# Patient Record
Sex: FEMALE | Race: WHITE | Employment: UNEMPLOYED | URBAN - NONMETROPOLITAN AREA
[De-identification: names, ages, dates, MRNs, and addresses within clinical notes are randomized per-mention and may not be internally consistent; named-entity substitution may affect disease eponyms.]

---

## 2019-07-23 ENCOUNTER — APPOINTMENT (OUTPATIENT)
Dept: GENERAL RADIOLOGY | Age: 29
End: 2019-07-23
Payer: MEDICAID

## 2019-07-23 ENCOUNTER — HOSPITAL ENCOUNTER (EMERGENCY)
Age: 29
Discharge: HOME | End: 2019-07-23
Payer: MEDICAID

## 2019-07-23 VITALS
BODY MASS INDEX: 22.5 KG/M2 | HEIGHT: 66 IN | HEART RATE: 100 BPM | TEMPERATURE: 98.3 F | RESPIRATION RATE: 14 BRPM | SYSTOLIC BLOOD PRESSURE: 118 MMHG | DIASTOLIC BLOOD PRESSURE: 59 MMHG | WEIGHT: 140 LBS | OXYGEN SATURATION: 98 %

## 2019-07-23 DIAGNOSIS — L98.492: Primary | ICD-10-CM

## 2019-07-23 LAB
ACETAMINOPHEN LEVEL: < 5 UG/ML (ref 0–20)
ALBUMIN SERPL-MCNC: 4.3 G/DL (ref 3.5–5.1)
ALP BLD-CCNC: 51 U/L (ref 38–126)
ALT SERPL-CCNC: 8 U/L (ref 11–66)
ANION GAP SERPL CALCULATED.3IONS-SCNC: 14 MEQ/L (ref 8–16)
AST SERPL-CCNC: 11 U/L (ref 5–40)
BASOPHILS # BLD: 0.5 %
BASOPHILS ABSOLUTE: 0 THOU/MM3 (ref 0–0.1)
BILIRUB SERPL-MCNC: 0.3 MG/DL (ref 0.3–1.2)
BILIRUBIN DIRECT: < 0.2 MG/DL (ref 0–0.3)
BUN BLDV-MCNC: 6 MG/DL (ref 7–22)
CALCIUM SERPL-MCNC: 9.4 MG/DL (ref 8.5–10.5)
CHLORIDE BLD-SCNC: 109 MEQ/L (ref 98–111)
CO2: 21 MEQ/L (ref 23–33)
CREAT SERPL-MCNC: 0.7 MG/DL (ref 0.4–1.2)
EOSINOPHIL # BLD: 1.3 %
EOSINOPHILS ABSOLUTE: 0.1 THOU/MM3 (ref 0–0.4)
ERYTHROCYTE [DISTWIDTH] IN BLOOD BY AUTOMATED COUNT: 14.6 % (ref 11.5–14.5)
ERYTHROCYTE [DISTWIDTH] IN BLOOD BY AUTOMATED COUNT: 48.2 FL (ref 35–45)
ETHYL ALCOHOL, SERUM: < 0.01 %
GFR SERPL CREATININE-BSD FRML MDRD: > 90 ML/MIN/1.73M2
GLUCOSE BLD-MCNC: 95 MG/DL (ref 70–108)
HCT VFR BLD CALC: 36.5 % (ref 37–47)
HEMOGLOBIN: 12 GM/DL (ref 12–16)
IMMATURE GRANS (ABS): 0.02 THOU/MM3 (ref 0–0.07)
IMMATURE GRANULOCYTES: 0.2 %
LACTIC ACID: 1 MMOL/L (ref 0.5–2.2)
LYMPHOCYTES # BLD: 23 %
LYMPHOCYTES ABSOLUTE: 1.9 THOU/MM3 (ref 1–4.8)
MCH RBC QN AUTO: 29.6 PG (ref 26–33)
MCHC RBC AUTO-ENTMCNC: 32.9 GM/DL (ref 32.2–35.5)
MCV RBC AUTO: 90.1 FL (ref 81–99)
MONOCYTES # BLD: 7 %
MONOCYTES ABSOLUTE: 0.6 THOU/MM3 (ref 0.4–1.3)
NUCLEATED RED BLOOD CELLS: 0 /100 WBC
OSMOLALITY CALCULATION: 284.3 MOSMOL/KG (ref 275–300)
PLATELET # BLD: 324 THOU/MM3 (ref 130–400)
PMV BLD AUTO: 9.5 FL (ref 9.4–12.4)
POTASSIUM SERPL-SCNC: 3.6 MEQ/L (ref 3.5–5.2)
RBC # BLD: 4.05 MILL/MM3 (ref 4.2–5.4)
SALICYLATE, SERUM: < 0.3 MG/DL (ref 2–10)
SEG NEUTROPHILS: 68 %
SEGMENTED NEUTROPHILS ABSOLUTE COUNT: 5.6 THOU/MM3 (ref 1.8–7.7)
SODIUM BLD-SCNC: 144 MEQ/L (ref 135–145)
TOTAL PROTEIN: 7.4 G/DL (ref 6.1–8)
WBC # BLD: 8.2 THOU/MM3 (ref 4.8–10.8)

## 2019-07-23 PROCEDURE — 73090 X-RAY EXAM OF FOREARM: CPT

## 2019-07-23 PROCEDURE — 36415 COLL VENOUS BLD VENIPUNCTURE: CPT

## 2019-07-23 PROCEDURE — 2709999900 HC NON-CHARGEABLE SUPPLY

## 2019-07-23 PROCEDURE — 80053 COMPREHEN METABOLIC PANEL: CPT

## 2019-07-23 PROCEDURE — G0480 DRUG TEST DEF 1-7 CLASSES: HCPCS

## 2019-07-23 PROCEDURE — 82248 BILIRUBIN DIRECT: CPT

## 2019-07-23 PROCEDURE — 87040 BLOOD CULTURE FOR BACTERIA: CPT

## 2019-07-23 PROCEDURE — 6360000002 HC RX W HCPCS: Performed by: PHYSICIAN ASSISTANT

## 2019-07-23 PROCEDURE — 90471 IMMUNIZATION ADMIN: CPT | Performed by: PHYSICIAN ASSISTANT

## 2019-07-23 PROCEDURE — 90715 TDAP VACCINE 7 YRS/> IM: CPT | Performed by: PHYSICIAN ASSISTANT

## 2019-07-23 PROCEDURE — 83605 ASSAY OF LACTIC ACID: CPT

## 2019-07-23 PROCEDURE — 6370000000 HC RX 637 (ALT 250 FOR IP): Performed by: PHYSICIAN ASSISTANT

## 2019-07-23 PROCEDURE — 85025 COMPLETE CBC W/AUTO DIFF WBC: CPT

## 2019-07-23 PROCEDURE — 99283 EMERGENCY DEPT VISIT LOW MDM: CPT

## 2019-07-23 RX ORDER — GINSENG 100 MG
CAPSULE ORAL ONCE
Status: COMPLETED | OUTPATIENT
Start: 2019-07-23 | End: 2019-07-23

## 2019-07-23 RX ORDER — NAPROXEN 500 MG/1
500 TABLET ORAL 2 TIMES DAILY
Qty: 60 TABLET | Refills: 0 | Status: SHIPPED | OUTPATIENT
Start: 2019-07-23

## 2019-07-23 RX ORDER — BACITRACIN, NEOMYCIN, POLYMYXIN B 400; 3.5; 5 [USP'U]/G; MG/G; [USP'U]/G
OINTMENT TOPICAL
Qty: 35 G | Refills: 1 | Status: SHIPPED | OUTPATIENT
Start: 2019-07-23 | End: 2019-08-02

## 2019-07-23 RX ADMIN — TETANUS TOXOID, REDUCED DIPHTHERIA TOXOID AND ACELLULAR PERTUSSIS VACCINE, ADSORBED 0.5 ML: 5; 2.5; 8; 8; 2.5 SUSPENSION INTRAMUSCULAR at 21:34

## 2019-07-23 RX ADMIN — BACITRACIN: 500 OINTMENT TOPICAL at 21:34

## 2019-07-23 ASSESSMENT — PAIN DESCRIPTION - ORIENTATION
ORIENTATION: RIGHT;LEFT
ORIENTATION: RIGHT;LEFT;LOWER

## 2019-07-23 ASSESSMENT — PAIN DESCRIPTION - PAIN TYPE
TYPE: ACUTE PAIN
TYPE: ACUTE PAIN

## 2019-07-23 ASSESSMENT — PAIN DESCRIPTION - FREQUENCY
FREQUENCY: CONTINUOUS
FREQUENCY: CONTINUOUS

## 2019-07-23 ASSESSMENT — PAIN DESCRIPTION - LOCATION
LOCATION: ARM
LOCATION: ARM

## 2019-07-23 ASSESSMENT — PAIN SCALES - GENERAL
PAINLEVEL_OUTOF10: 9
PAINLEVEL_OUTOF10: 9

## 2019-07-23 ASSESSMENT — PAIN DESCRIPTION - DESCRIPTORS
DESCRIPTORS: ACHING
DESCRIPTORS: ACHING

## 2019-07-23 ASSESSMENT — PAIN DESCRIPTION - ONSET: ONSET: PROGRESSIVE

## 2019-07-29 LAB
BLOOD CULTURE, ROUTINE: NORMAL
BLOOD CULTURE, ROUTINE: NORMAL

## 2022-12-14 ENCOUNTER — HOSPITAL ENCOUNTER (OUTPATIENT)
Age: 32
Discharge: HOME | End: 2022-12-14
Payer: MEDICAID

## 2022-12-14 ENCOUNTER — OFFICE VISIT (OUTPATIENT)
Dept: FAMILY MEDICINE CLINIC | Age: 32
End: 2022-12-14
Payer: MEDICAID

## 2022-12-14 VITALS
RESPIRATION RATE: 16 BRPM | BODY MASS INDEX: 31.02 KG/M2 | SYSTOLIC BLOOD PRESSURE: 112 MMHG | WEIGHT: 193 LBS | DIASTOLIC BLOOD PRESSURE: 78 MMHG | OXYGEN SATURATION: 99 % | HEART RATE: 86 BPM | HEIGHT: 66 IN

## 2022-12-14 DIAGNOSIS — L30.9 DERMATITIS: Primary | ICD-10-CM

## 2022-12-14 DIAGNOSIS — Z32.01 POSITIVE URINE PREGNANCY TEST: ICD-10-CM

## 2022-12-14 LAB — HCG QUANTITATIVE: ABNORMAL MIU/ML

## 2022-12-14 PROCEDURE — 36415 COLL VENOUS BLD VENIPUNCTURE: CPT

## 2022-12-14 PROCEDURE — G8419 CALC BMI OUT NRM PARAM NOF/U: HCPCS | Performed by: NURSE PRACTITIONER

## 2022-12-14 PROCEDURE — G8427 DOCREV CUR MEDS BY ELIG CLIN: HCPCS | Performed by: NURSE PRACTITIONER

## 2022-12-14 PROCEDURE — 99203 OFFICE O/P NEW LOW 30 MIN: CPT | Performed by: NURSE PRACTITIONER

## 2022-12-14 PROCEDURE — G8484 FLU IMMUNIZE NO ADMIN: HCPCS | Performed by: NURSE PRACTITIONER

## 2022-12-14 PROCEDURE — 84702 CHORIONIC GONADOTROPIN TEST: CPT

## 2022-12-14 PROCEDURE — 4004F PT TOBACCO SCREEN RCVD TLK: CPT | Performed by: NURSE PRACTITIONER

## 2022-12-14 RX ORDER — DESONIDE 0.5 MG/G
CREAM TOPICAL
Qty: 60 G | Refills: 0 | Status: SHIPPED | OUTPATIENT
Start: 2022-12-14

## 2022-12-14 RX ORDER — OMEPRAZOLE 20 MG/1
20 CAPSULE, DELAYED RELEASE ORAL DAILY
COMMUNITY

## 2022-12-14 ASSESSMENT — PATIENT HEALTH QUESTIONNAIRE - PHQ9
SUM OF ALL RESPONSES TO PHQ9 QUESTIONS 1 & 2: 0
2. FEELING DOWN, DEPRESSED OR HOPELESS: 0
SUM OF ALL RESPONSES TO PHQ QUESTIONS 1-9: 0
1. LITTLE INTEREST OR PLEASURE IN DOING THINGS: 0
SUM OF ALL RESPONSES TO PHQ QUESTIONS 1-9: 0

## 2022-12-14 NOTE — PROGRESS NOTES
Subjective:      Patient ID: Archie Soto is a 27 y.o. female coming in for   Chief Complaint   Patient presents with    New Patient     New to provider    Pregnancy Test     Had a positive at home pregnancy test. Would like a pregnancy test to confirm. She has not seen an OBGYN but does have one in [address] LMP 10/17/22. Positive at home test a couple weeks ago. She does have some breast tenderness, no N/V. She is not taking a prenatal vitamin    Rash     Rash on her back, on her sides and under her breasts. Itching. Redness. X 1 month. HPI  Presents to office for new pt appt. Pt wanting pregnancy testing done. She has had a home positive test. LMP 10/17/22.  2 Para 1. She has not established with OB yet. No N/V symptoms, urinary issues/symptoms. She is not taking any prenatal vitamins. Rash to back, sides and under breast area comes and goes. Seems to be triggered by scented detergents. Has not tried anything other than otc hydrocortisone cream.     Review of Systems   Skin:  Positive for rash. All other systems reviewed and are negative. Objective:/78   Pulse 86   Resp 16   Ht 5' 6\" (1.676 m)   Wt 193 lb (87.5 kg)   LMP 10/18/2022 (Approximate)   SpO2 99%   BMI 31.15 kg/m²      Physical Exam  Vitals and nursing note reviewed. Constitutional:       General: She is not in acute distress. Appearance: Normal appearance. She is not ill-appearing. HENT:      Head: Normocephalic. Cardiovascular:      Rate and Rhythm: Normal rate and regular rhythm. Heart sounds: Normal heart sounds. Pulmonary:      Effort: Pulmonary effort is normal.      Breath sounds: Normal breath sounds. Musculoskeletal:      Right lower leg: No edema. Left lower leg: No edema. Skin:     General: Skin is warm and dry. Findings: No rash. Neurological:      General: No focal deficit present. Mental Status: She is alert and oriented to person, place, and time. Assessment:      1. Dermatitis    2. Positive urine pregnancy test           Plan:    - desonide for rash, change laundry soaps  - will obtain hcg blood test, OB referral placed. Recommended daily prenatal mvi. Advised to not take any nsaids. May take tylenol, benadryl and omeprazole. -f/u with me as needed. Orders Placed This Encounter   Procedures    hCG, Quantitative, Pregnancy     Standing Status:   Future     Number of Occurrences:   1     Standing Expiration Date:   12/14/2023    [facility] , NP, OB/GYN, [place]     Referral Priority:   Routine     Referral Type:   Eval and Treat     Referral Reason:   Specialty Services Required     Referred to Provider:   PRINCESS Cardoza CNP     Requested Specialty:   Obstetrics & Gynecology     Number of Visits Requested:   1      Outpatient Encounter Medications as of 12/14/2022   Medication Sig Dispense Refill    omeprazole (PRILOSEC) 20 MG delayed release capsule Take 20 mg by mouth daily      desonide (DESOWEN) 0.05 % cream Apply topically 2 times daily. 60 g 0    [DISCONTINUED] lamoTRIgine (SUBVENITE PO) Take 16 mg by mouth 2 times daily (Patient not taking: Reported on 12/14/2022)      [DISCONTINUED] naproxen (NAPROSYN) 500 MG tablet Take 1 tablet by mouth 2 times daily 60 tablet 0     No facility-administered encounter medications on file as of 12/14/2022.             PRINCESS Parrish - CNP

## 2022-12-15 ENCOUNTER — OFFICE VISIT (OUTPATIENT)
Dept: OBGYN | Age: 32
End: 2022-12-15
Payer: MEDICAID

## 2022-12-15 VITALS
HEART RATE: 90 BPM | WEIGHT: 189.8 LBS | BODY MASS INDEX: 30.5 KG/M2 | DIASTOLIC BLOOD PRESSURE: 70 MMHG | SYSTOLIC BLOOD PRESSURE: 122 MMHG | OXYGEN SATURATION: 98 % | RESPIRATION RATE: 16 BRPM | HEIGHT: 66 IN

## 2022-12-15 DIAGNOSIS — Z32.00 UNCONFIRMED PREGNANCY: Primary | ICD-10-CM

## 2022-12-15 DIAGNOSIS — Z34.90 EARLY STAGE OF PREGNANCY: ICD-10-CM

## 2022-12-15 DIAGNOSIS — Z71.6 ENCOUNTER FOR SMOKING CESSATION COUNSELING: ICD-10-CM

## 2022-12-15 PROCEDURE — 99214 OFFICE O/P EST MOD 30 MIN: CPT

## 2022-12-15 RX ORDER — PNV NO.95/FERROUS FUM/FOLIC AC 28MG-0.8MG
TABLET ORAL
Qty: 100 CAPSULE | Refills: 5 | Status: SHIPPED | OUTPATIENT
Start: 2022-12-15

## 2022-12-15 NOTE — PROGRESS NOTES
CC: Initial Prenatal Visit    Subjective:     Galina Sanchez is a [address] y.o. female Juvenal Weiner is being seen today for her first obstetrical visit. This {is/is not:9024} a planned pregnancy. She is at Unknown  Her obstetrical history is significant for obesity. Had paragard[facility]  Denies CA h      Review of Systems       Objective:   Blood pressure 122/70, pulse 90, resp. rate 16, height 5' 6\" (1.676 m), weight 189 lb 12.8 oz (86.1 kg), last menstrual period 10/18/2022, SpO2 98 %, not currently breastfeeding. See Physical Exam under Prenatal Visit tab    Chaperone for Intimate Exam  Chaperone: SUSAN Cobian LPN      Assessment:      Pregnancy at {numbers; 0-42:10726} and {numbers; 0-7:68869}/7 weeks   {No diagnosis found. (Refresh or delete this SmartLink)}    Plan:     Initial labs drawn. Prenatal vitamins ordered  Dating US ordered  NIPT Testing ordered  Pap & Cultures Collected  If Negative Cytology, Follow-up screening per current guidelines.    Discussed  Labor precautions  Discussed birth at [facility]  Education: PNV with folic acid; avoid tobacco, alcohol, recreational drugs (marijuana); adequate hydration; nutrition and food safety; no litter box clearning; avoid hot tubs/saunas; emelia, peppermint, small meals to help nausea  Follow-up in 2 weeks  Next visit:     *** minutes spent with pt on education, evaluation, and assessment    Electronically signed by PRINCESS Correa CNM 12/15/2022 10:51 AM .

## 2022-12-16 ASSESSMENT — ENCOUNTER SYMPTOMS
ABDOMINAL PAIN: 0
SHORTNESS OF BREATH: 0
DIARRHEA: 0
CONSTIPATION: 0

## 2022-12-16 NOTE — PROGRESS NOTES
CC: Unconfirmed Pregnancy Visit    Subjective:     Misty Ellison is a 27 y.o. female   LMP 10/18/2022       Nick Parekh had a positive home pregnancy test and a positive Quant hCG test ordered by her PCP; she was then referred to this office for prenatal care. She reports fatigue and breast tenderness but little to no nausea/vomiting. She reports this pregnancy was not planned specifically, she and  Sidra were not actively trying to conceive but she had her Paragard IUD removed and they were not using condoms. They are excited and happy about the pregnancy. She is a 0.25 pack/day cigarette smokerOB risk factors: obesity       Review of Systems   Constitutional:  Negative for chills, fatigue and fever. Eyes:  Negative for visual disturbance. Respiratory:  Negative for shortness of breath. Cardiovascular:  Negative for chest pain. Gastrointestinal:  Negative for abdominal pain, constipation and diarrhea. Endocrine: Negative for cold intolerance and heat intolerance. Genitourinary:  Negative for difficulty urinating, dysuria, frequency, menstrual problem, vaginal bleeding, vaginal discharge and vaginal pain. Musculoskeletal: Negative. Skin: Negative. Neurological:  Negative for headaches. Psychiatric/Behavioral: Negative. Objective:   Blood pressure 122/70, pulse 90, resp. rate 16, height 5' 6\" (1.676 m), weight 189 lb 12.8 oz (86.1 kg), last menstrual period 10/18/2022, SpO2 98 %, not currently breastfeeding. Physical Exam  Vitals and nursing note reviewed. Constitutional:       Appearance: Normal appearance. HENT:      Head: Normocephalic. Pulmonary:      Effort: Pulmonary effort is normal.   Abdominal:      General: Abdomen is flat. Palpations: Abdomen is soft. Musculoskeletal:         General: Normal range of motion. Cervical back: Normal range of motion. No tenderness. Skin:     General: Skin is warm and dry.    Neurological:      General: No focal deficit present. Mental Status: She is alert and oriented to person, place, and time. Psychiatric:         Mood and Affect: Mood normal.         Behavior: Behavior normal.     Breast and Pelvic exam: deferred    Assessment:      Diagnosis Orders   1. Unconfirmed pregnancy        2. Early stage of pregnancy  Prenatal MV-Min-Fe Fum-FA-DHA (PRENATAL MULTIVITAMIN PLUS DHA) 27-0.8-250 MG CAPS    US OB LESS THAN 14 WEEKS SINGLE OR FIRST GESTATION          Plan:     Quantitative hCG drawn  Prenatal vitamins. Discussed delivery at [facility]  Discussed smoking cessation; she would like to quit. Suggested cutting down by 1 cigarette weekly until no longer smoking, offered any assistance she would like to help her quit    Addendum:   Quantitative hCG positive  Dx early stage of pregnancy  RTC in 1-2 weeks for initial OB visit and dating [address]    Return for initial OB visit in 1-2 weeks. 30 minutes spent on education, evaluation, and assessment.     Electronically signed by PRINCESS Rai CNM 12/16/2022 10:05 AM .

## 2022-12-29 ENCOUNTER — INITIAL PRENATAL (OUTPATIENT)
Dept: OBGYN | Age: 32
End: 2022-12-29
Payer: MEDICAID

## 2022-12-29 ENCOUNTER — HOSPITAL ENCOUNTER (OUTPATIENT)
Dept: ULTRASOUND IMAGING | Age: 32
Discharge: HOME | End: 2022-12-31
Payer: MEDICAID

## 2022-12-29 ENCOUNTER — HOSPITAL ENCOUNTER (OUTPATIENT)
Age: 32
Discharge: HOME | End: 2022-12-29
Payer: MEDICAID

## 2022-12-29 ENCOUNTER — HOSPITAL ENCOUNTER (OUTPATIENT)
Age: 32
Setting detail: SPECIMEN
Discharge: HOME | End: 2022-12-29
Payer: MEDICAID

## 2022-12-29 VITALS
SYSTOLIC BLOOD PRESSURE: 124 MMHG | HEIGHT: 66 IN | WEIGHT: 193 LBS | OXYGEN SATURATION: 99 % | DIASTOLIC BLOOD PRESSURE: 72 MMHG | HEART RATE: 91 BPM | RESPIRATION RATE: 16 BRPM | BODY MASS INDEX: 31.02 KG/M2

## 2022-12-29 DIAGNOSIS — Z34.91 PRENATAL CARE, FIRST TRIMESTER: ICD-10-CM

## 2022-12-29 DIAGNOSIS — Z34.90 EARLY STAGE OF PREGNANCY: ICD-10-CM

## 2022-12-29 DIAGNOSIS — Z71.6 ENCOUNTER FOR SMOKING CESSATION COUNSELING: ICD-10-CM

## 2022-12-29 DIAGNOSIS — Z34.91 PRENATAL CARE, FIRST TRIMESTER: Primary | ICD-10-CM

## 2022-12-29 LAB
ABO/RH: NORMAL
AMPHETAMINE SCREEN URINE: NEGATIVE
ANTIBODY SCREEN: NEGATIVE
BACTERIA: ABNORMAL
BARBITURATE SCREEN URINE: NEGATIVE
BENZODIAZEPINE SCREEN, URINE: NEGATIVE
BILIRUBIN URINE: NEGATIVE
BUPRENORPHINE URINE: NEGATIVE
CANNABINOID SCREEN URINE: NEGATIVE
COCAINE METABOLITE, URINE: NEGATIVE
EPITHELIAL CELLS UA: ABNORMAL /HPF (ref 0–5)
GLUCOSE ADMINISTRATION: NORMAL
GLUCOSE TOLERANCE SCREEN 50G: 83 MG/DL (ref 70–135)
GLUCOSE URINE: NEGATIVE
KETONES, URINE: NEGATIVE
LEUKOCYTE ESTERASE, URINE: NEGATIVE
METHADONE SCREEN, URINE: NEGATIVE
METHAMPHETAMINE, URINE: NEGATIVE
NITRITE, URINE: NEGATIVE
OPIATES, URINE: NEGATIVE
OXYCODONE SCREEN URINE: NEGATIVE
PH UA: 7.5 (ref 5–6)
PHENCYCLIDINE, URINE: NEGATIVE
PROPOXYPHENE, URINE: NEGATIVE
PROTEIN UA: NEGATIVE
RBC UA: ABNORMAL /HPF (ref 0–4)
SPECIFIC GRAVITY UA: 1.01 (ref 1.01–1.02)
TEST INFORMATION: NORMAL
TRICYCLIC ANTIDEPRESSANTS, UR: NEGATIVE
URINE HGB: NEGATIVE
UROBILINOGEN, URINE: NORMAL
WBC UA: ABNORMAL /HPF (ref 0–4)

## 2022-12-29 PROCEDURE — 82306 VITAMIN D 25 HYDROXY: CPT

## 2022-12-29 PROCEDURE — G8484 FLU IMMUNIZE NO ADMIN: HCPCS | Performed by: NURSE PRACTITIONER

## 2022-12-29 PROCEDURE — 86780 TREPONEMA PALLIDUM: CPT

## 2022-12-29 PROCEDURE — 82950 GLUCOSE TEST: CPT

## 2022-12-29 PROCEDURE — G8419 CALC BMI OUT NRM PARAM NOF/U: HCPCS | Performed by: NURSE PRACTITIONER

## 2022-12-29 PROCEDURE — 87389 HIV-1 AG W/HIV-1&-2 AB AG IA: CPT

## 2022-12-29 PROCEDURE — 80306 DRUG TEST PRSMV INSTRMNT: CPT

## 2022-12-29 PROCEDURE — 85025 COMPLETE CBC W/AUTO DIFF WBC: CPT

## 2022-12-29 PROCEDURE — 81001 URINALYSIS AUTO W/SCOPE: CPT

## 2022-12-29 PROCEDURE — 36415 COLL VENOUS BLD VENIPUNCTURE: CPT

## 2022-12-29 PROCEDURE — 76801 OB US < 14 WKS SINGLE FETUS: CPT

## 2022-12-29 PROCEDURE — 86762 RUBELLA ANTIBODY: CPT

## 2022-12-29 PROCEDURE — 86850 RBC ANTIBODY SCREEN: CPT

## 2022-12-29 PROCEDURE — G8427 DOCREV CUR MEDS BY ELIG CLIN: HCPCS | Performed by: NURSE PRACTITIONER

## 2022-12-29 PROCEDURE — 86900 BLOOD TYPING SEROLOGIC ABO: CPT

## 2022-12-29 PROCEDURE — 99213 OFFICE O/P EST LOW 20 MIN: CPT | Performed by: NURSE PRACTITIONER

## 2022-12-29 PROCEDURE — 86787 VARICELLA-ZOSTER ANTIBODY: CPT

## 2022-12-29 PROCEDURE — 87491 CHLMYD TRACH DNA AMP PROBE: CPT

## 2022-12-29 PROCEDURE — 87086 URINE CULTURE/COLONY COUNT: CPT

## 2022-12-29 PROCEDURE — 87340 HEPATITIS B SURFACE AG IA: CPT

## 2022-12-29 PROCEDURE — 86803 HEPATITIS C AB TEST: CPT

## 2022-12-29 PROCEDURE — 87591 N.GONORRHOEAE DNA AMP PROB: CPT

## 2022-12-29 PROCEDURE — 86901 BLOOD TYPING SEROLOGIC RH(D): CPT

## 2022-12-29 PROCEDURE — 4004F PT TOBACCO SCREEN RCVD TLK: CPT | Performed by: NURSE PRACTITIONER

## 2022-12-29 ASSESSMENT — ENCOUNTER SYMPTOMS
DIARRHEA: 0
SHORTNESS OF BREATH: 0
CONSTIPATION: 0
ABDOMINAL PAIN: 0

## 2022-12-29 NOTE — PROGRESS NOTES
Subjective:     Td Omalley is a 27 y.o. female Vilma Jorge is being seen today for her initial prenatal visit. Her obstetrical history is significant for obesity. Gestational age is 10w4d per US. She is experiencing typical sx of early pregnancy but very mild sx: fatigue, nausea, urinary frequency. No concerns. Denies VB, LOF. Review of Systems   Constitutional:  Negative for chills and fever. Eyes:  Negative for visual disturbance. Respiratory:  Negative for shortness of breath. Cardiovascular:  Negative for chest pain. Gastrointestinal:  Negative for abdominal pain, constipation and diarrhea. Endocrine: Negative for cold intolerance and heat intolerance. Genitourinary:  Negative for difficulty urinating, urgency, vaginal bleeding, vaginal discharge and vaginal pain. Musculoskeletal: Negative. Skin: Negative. Neurological:  Negative for headaches. Psychiatric/Behavioral: Negative. Objective:   Blood pressure 124/72, pulse 91, resp. rate 16, height 5' 6\" (1.676 m), weight 193 lb (87.5 kg), last menstrual period 10/18/2022, SpO2 99 %, not currently breastfeeding. O: MVSS, Afebrile. Abdomen gravid, nontender S=D, +FHT's 150 by doppler  See Physical Exam under Prenatal Visit tab  Breast/Pelvic Exam Deferred      Assessment:      Pregnancy at 10 and 4/7 weeks    Diagnosis Orders   1. Prenatal care, first trimester  C.trachomatis N.gonorrhoeae DNA, Urine    Culture, Urine    Glucose tolerance, 1 hour    Hepatitis C Antibody    HIV Screen    Prenatal Profile I    Urine Drug Screen    Urinalysis with Microscopic    Vitamin D 25 Hydroxy    Varicella Zoster Antibody, IgG    Panorama Prenatal Test: Chromosomes 13, 18, 21, X & Y: Triploidy 22Q.11.2 Deletion      A: 27 y.o.  at [facility]    Plan:     Initial labs drawn.   Continue Prenatal vitamins   NIPT Testing ordered  If Negative Cytology, Follow-up screening per current guidelines: last pap 2020, next due 2023  Discussed  Labor precautions  Discussed birth at [facility]  Discussed smoking cessation: decrease number of cigarettes by 1 cigarette weekly until no longer smoking  Education: PNV with folic acid; avoid tobacco, alcohol, recreational drugs (marijuana); adequate hydration; nutrition and food safety; no litter box cleaning; avoid hot tubs/saunas; emelia, peppermint, small meals to help nausea. Exercise, mood changes, Tylenol for headaches  P: Education: discussed and reviewed PTL, danger S&S, when to call. Return for f/u at next prenatal visit in 4 weeks.     20 minutes spent with pt on education, evaluation, and assessment    Electronically signed by PRINCESS Villatoro CNM 2022 11:55 AM .

## 2022-12-30 LAB
ABSOLUTE EOS #: 0.08 K/UL (ref 0–0.44)
ABSOLUTE IMMATURE GRANULOCYTE: 0.04 K/UL (ref 0–0.3)
ABSOLUTE LYMPH #: 2.75 K/UL (ref 1.1–3.7)
ABSOLUTE MONO #: 0.7 K/UL (ref 0.1–1.2)
BASOPHILS # BLD: 0 % (ref 0–2)
BASOPHILS ABSOLUTE: <0.03 K/UL (ref 0–0.2)
C. TRACHOMATIS DNA ,URINE: NEGATIVE
CULTURE: NORMAL
EOSINOPHILS RELATIVE PERCENT: 1 % (ref 1–4)
HCT VFR BLD CALC: 40.3 % (ref 36.3–47.1)
HEMOGLOBIN: 13.3 G/DL (ref 11.9–15.1)
HEPATITIS B SURFACE ANTIGEN: NONREACTIVE
HEPATITIS C ANTIBODY: REACTIVE
HIV AG/AB: NONREACTIVE
IMMATURE GRANULOCYTES: 1 %
LYMPHOCYTES # BLD: 33 % (ref 24–43)
MCH RBC QN AUTO: 31.8 PG (ref 25.2–33.5)
MCHC RBC AUTO-ENTMCNC: 33 G/DL (ref 25.2–33.5)
MCV RBC AUTO: 96.4 FL (ref 82.6–102.9)
MONOCYTES # BLD: 8 % (ref 3–12)
N. GONORRHOEAE DNA, URINE: NEGATIVE
NRBC AUTOMATED: 0 PER 100 WBC
PDW BLD-RTO: 12.2 % (ref 11.8–14.4)
PLATELET # BLD: 318 K/UL (ref 138–453)
PMV BLD AUTO: 9.3 FL (ref 8.1–13.5)
RBC # BLD: 4.18 M/UL (ref 3.95–5.11)
RUBV IGG SER QL: 181.7 IU/ML
SEG NEUTROPHILS: 57 % (ref 36–65)
SEGMENTED NEUTROPHILS ABSOLUTE COUNT: 4.78 K/UL (ref 1.5–8.1)
SPECIMEN DESCRIPTION: NORMAL
SPECIMEN DESCRIPTION: NORMAL
T. PALLIDUM, IGG: NONREACTIVE
VITAMIN D 25-HYDROXY: 20.6 NG/ML
VZV IGG SER QL IA: 2.37
WBC # BLD: 8.4 K/UL (ref 3.5–11.3)

## 2023-01-03 DIAGNOSIS — E55.9 VITAMIN D DEFICIENCY: Primary | ICD-10-CM

## 2023-01-03 DIAGNOSIS — R76.8 HEPATITIS C ANTIBODY POSITIVE IN BLOOD: ICD-10-CM

## 2023-01-03 RX ORDER — MELATONIN
2000 DAILY
Qty: 90 TABLET | Refills: 1 | Status: SHIPPED | OUTPATIENT
Start: 2023-01-03

## 2023-01-03 NOTE — PROGRESS NOTES
Order placed for Hepatitis C RNA PCR d/t positive antibody screen.   PRINCESS Lee - KIRK, 1/3/2023 12:15 PM

## 2023-01-03 NOTE — RESULT ENCOUNTER NOTE
Results reviewed, contact patient with abnormal result. Rx sent to pharmacy for Vit D. Pt needs to come to lab for further Hepatitis C testing d/t positive antibody result.  JOSE ENRIQUE/KIRK

## 2023-01-05 ENCOUNTER — HOSPITAL ENCOUNTER (OUTPATIENT)
Age: 33
Discharge: HOME | End: 2023-01-05
Payer: MEDICAID

## 2023-01-05 DIAGNOSIS — R76.8 HEPATITIS C ANTIBODY POSITIVE IN BLOOD: ICD-10-CM

## 2023-01-05 PROCEDURE — 87522 HEPATITIS C REVRS TRNSCRPJ: CPT

## 2023-01-05 PROCEDURE — 36415 COLL VENOUS BLD VENIPUNCTURE: CPT

## 2023-01-08 LAB
HEPATITIS C RNA PCR QUANT: NOT DETECTED
SOURCE: NORMAL

## 2023-01-27 ENCOUNTER — ROUTINE PRENATAL (OUTPATIENT)
Dept: OBGYN | Age: 33
End: 2023-01-27
Payer: MEDICAID

## 2023-01-27 VITALS
HEIGHT: 66 IN | DIASTOLIC BLOOD PRESSURE: 70 MMHG | SYSTOLIC BLOOD PRESSURE: 112 MMHG | OXYGEN SATURATION: 98 % | BODY MASS INDEX: 31.05 KG/M2 | HEART RATE: 88 BPM | RESPIRATION RATE: 16 BRPM | WEIGHT: 193.2 LBS

## 2023-01-27 DIAGNOSIS — L84 FOOT CALLUS: ICD-10-CM

## 2023-01-27 DIAGNOSIS — Z34.92 PRENATAL CARE, SECOND TRIMESTER: Primary | ICD-10-CM

## 2023-01-27 PROCEDURE — 99214 OFFICE O/P EST MOD 30 MIN: CPT

## 2023-01-27 NOTE — PROGRESS NOTES
S: Parth Fagan presents with  Sidra for prenatal visit today. Feeling well. Reports feeling occasional \"flutters\" of fetal movement. Denies contractions. Denies VB, LOF. No pregnancy related concerns. 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 She states they feel as though she is stepping on a rock. She wears supportive shoes but the pain can be so severe that she is in tears; they are especially painful at night and prevent her from sleeping. Has tried Tylenol without relief. O: MVSS, Afebrile. Abdomen gravid, nontender, S=D, +FHT's +FM  A: 26 y.o.  at 14w5d SIUP, +FHT's  P: Education: discussed and reviewed PTL, danger S&S, when to call. Discussed skin changes (acne), MSAFP 16-20w, Anatomy US at 20w, stress reduction, exercise, healthy weight gain. Recommended soaking feet in epsom salts, gently file calluses on feet while softened. Referral to podiatry. Return for f/u at next prenatal visit in 4 weeks. 34 minutes spent in education, evaluation, and assessment.

## 2023-02-02 NOTE — RESULT ENCOUNTER NOTE
Called picc team [phone] at [phone] spoke to [address]  02/02/23 5874 Results reviewed, please contact patient with normal result. Baby is low risk for any of the chromosomal alterations that were tested. Please remind pt to NOT check report in My Chart if gender is to remain a surprise.  JOSE ENRIQUE/KIRK

## 2023-02-08 ENCOUNTER — OFFICE VISIT (OUTPATIENT)
Dept: PODIATRY | Age: 33
End: 2023-02-08
Payer: MEDICAID

## 2023-02-08 VITALS
DIASTOLIC BLOOD PRESSURE: 68 MMHG | BODY MASS INDEX: 31.99 KG/M2 | RESPIRATION RATE: 20 BRPM | SYSTOLIC BLOOD PRESSURE: 114 MMHG | WEIGHT: 198.2 LBS | HEART RATE: 84 BPM

## 2023-02-08 DIAGNOSIS — M21.6X2 EQUINUS DEFORMITY OF BOTH FEET: ICD-10-CM

## 2023-02-08 DIAGNOSIS — L84 CORNS AND CALLOSITIES: Primary | ICD-10-CM

## 2023-02-08 DIAGNOSIS — M79.671 FOOT PAIN, BILATERAL: ICD-10-CM

## 2023-02-08 DIAGNOSIS — M79.672 FOOT PAIN, BILATERAL: ICD-10-CM

## 2023-02-08 DIAGNOSIS — M21.6X1 EQUINUS DEFORMITY OF BOTH FEET: ICD-10-CM

## 2023-02-08 PROCEDURE — 99212 OFFICE O/P EST SF 10 MIN: CPT | Performed by: PODIATRIST

## 2023-02-08 PROCEDURE — 11057 PARNG/CUTG B9 HYPRKR LES >4: CPT | Performed by: PODIATRIST

## 2023-02-08 RX ORDER — UREA 40 %
CREAM (GRAM) TOPICAL
Qty: 1 EACH | Refills: 1 | Status: SHIPPED | OUTPATIENT
Start: 2023-02-08

## 2023-02-08 NOTE — PROGRESS NOTES
Subjective:  So Quiñonez is a 26 y.o. female who presents to the office today complaining of a painful callous on the Bilateral foot . Symptoms began  year(s) ago. Patient relates pain is Present. Pain is rated 5 out of 10 and is described as waxing and waning. Treatments prior to today's visit include: otc acid. Currently denies F/C/N/V. Allergies   Allergen Reactions    Nickel Rash       Past Medical History:   Diagnosis Date    Abnormal Pap smear of cervix     at 19years of age    Depression        Prior to Admission medications    Medication Sig Start Date End Date Taking? Authorizing Provider   vitamin D3 (CHOLECALCIFEROL) 25 MCG (1000 UT) TABS tablet Take 2 tablets by mouth daily 1/3/23  Yes PRINCESS Powers CNM   Prenatal MV-Min-Fe Fum-FA-DHA (PRENATAL MULTIVITAMIN PLUS DHA) 27-0.8-250 MG CAPS One tablet daily by mouth 12/15/22  Yes PRINCESS Powers CNM   omeprazole (PRILOSEC) 20 MG delayed release capsule Take 20 mg by mouth daily   Yes Historical Provider, MD morilloonide (DESOWEN) 0.05 % cream Apply topically 2 times daily. 12/14/22  Yes PRINCESS France CNP       No past surgical history on file. No family history on file. Social History     Tobacco Use    Smoking status: Every Day     Packs/day: 0.25     Years: 10.00     Pack years: 2.50     Types: Cigarettes    Smokeless tobacco: Never   Substance Use Topics    Alcohol use: No       ROS: All 14 ROS systems reviewed and pertinent positives noted above, all others negative. Lower Extremity Physical Examination:     Vitals:   Vitals:    02/08/23 0851   BP: 114/68   Pulse: 84   Resp: 20     General: AAO x 3 in NAD.      Vascular: DP and PT pulses palpable 2/4, bilateral.  CFT <3 seconds, bilateral.  Hair growth present to the level of the digits, bilateral.  Edema present, Bilateral.  Varicosities absent, bilateral. Erythema absent, bilateral. Distal Rubor absent bilateral.  Temperature within normal limits bilateral. Hyperpigmentation absent bilateral. Atrophic skin no. Neurological: Sensation intact to light touch to level of digits, bilateral.  Protective sensation intact 10/10 sites via 5.07/10g [place] Monofilament, bilateral.  negative Tinel's, bilateral.  negative Valleix sign, bilateral.  Vibratory intact bilateral.  Reflexes Decreased bilateral.  Paresthesias negative. Dysthesias negative. Sharp/dull intact bilateral.    Musculoskeletal: Muscle strength 5/5, bilateral.  Pain present upon palpation bilateral callous. Normal medial longitudinal arch, bilateral.  Ankle ROM ddecreased,bilateral.  1st MPJ ROM within normal limits, bilateral.  Dorsally contracted digits absent. No other foot deformities. Integument: Warm, dry, supple, bilateral.  Hyperkeratotic tissue is present. Central core is present upon debridement. Callous is located sub R hallux, sub R 3rd methad, sub R 5th met base, sub L 1,4,5 met heads. .  Open lesion absent, Bilateral.  Interdigital maceration absent to web spaces , bilateral.  Nails dystrophic . Fissures absent, Bilateral.      Asessment: Patient is a 26 y.o. female with:    Diagnosis Orders   1. Corns and callosities        2. Foot pain, bilateral        3. Equinus deformity of both feet            Plan: Patient examined and evaluated. Current condition and treatment options discussed in detail. Advised pt about offloading techniques. Pt given option of prefabricated insoles with offloading pads. Paring of 6 benign hyperkeratotic lesions took place with #10 blade or tissue nippers. OTC treatments for a callous were discussed. Due to level of pain/deformity, it is in my medical opinion that patient will benefit from and is medically necessary for custom orthotics at this time. Pt casted today for custom molded orthotics from [facility] [email or web address].   These will be an all sprot felxible device with 1 degree rearfoot varus post, full length 3/16 inch top cover ppt plastizote. Forefoot posting of 0 deg. regular arch fill. Modifications will be none. Orders Placed This Encounter   Medications    Urea (CARMOL) 40 % cream     Sig: Apply qd     Dispense:  1 each     Refill:  1     Pt has low level medical decision making. 1 new rx. Low risk morbidity. Contact office with any questions/problems/concerns. RTC in 3week(s).

## 2023-02-23 ENCOUNTER — OFFICE VISIT (OUTPATIENT)
Dept: PODIATRY | Age: 33
End: 2023-02-23
Payer: MEDICAID

## 2023-02-23 VITALS
SYSTOLIC BLOOD PRESSURE: 128 MMHG | HEART RATE: 80 BPM | DIASTOLIC BLOOD PRESSURE: 60 MMHG | WEIGHT: 198.8 LBS | BODY MASS INDEX: 32.09 KG/M2 | RESPIRATION RATE: 20 BRPM

## 2023-02-23 DIAGNOSIS — M79.672 FOOT PAIN, BILATERAL: Primary | ICD-10-CM

## 2023-02-23 DIAGNOSIS — M79.671 FOOT PAIN, BILATERAL: Primary | ICD-10-CM

## 2023-02-23 PROCEDURE — 99212 OFFICE O/P EST SF 10 MIN: CPT | Performed by: PODIATRIST

## 2023-03-02 ENCOUNTER — HOSPITAL ENCOUNTER (OUTPATIENT)
Dept: ULTRASOUND IMAGING | Age: 33
Discharge: HOME | End: 2023-03-04
Payer: MEDICAID

## 2023-03-02 ENCOUNTER — HOSPITAL ENCOUNTER (OUTPATIENT)
Age: 33
Discharge: HOME | End: 2023-03-02
Payer: MEDICAID

## 2023-03-02 DIAGNOSIS — Z34.92 PRENATAL CARE, SECOND TRIMESTER: ICD-10-CM

## 2023-03-02 DIAGNOSIS — Z34.92 PRENATAL CARE, SECOND TRIMESTER: Primary | ICD-10-CM

## 2023-03-02 PROCEDURE — 36415 COLL VENOUS BLD VENIPUNCTURE: CPT

## 2023-03-02 PROCEDURE — 82105 ALPHA-FETOPROTEIN SERUM: CPT

## 2023-03-02 PROCEDURE — 76811 OB US DETAILED SNGL FETUS: CPT

## 2023-03-04 LAB
AFP INTERPRETATION: NORMAL
AFP MOM: 0.82
AFP SPECIMEN: NORMAL
AFP: 38 NG/ML
DATE OF BIRTH: NORMAL
DATING METHOD: NORMAL
DETERMINED BY: NORMAL
DIABETIC: NEGATIVE
DONOR EGG?: NORMAL
DUE DATE: NORMAL
ESTIMATED DUE DATE: NORMAL
FAMILY HISTORY NTD: NEGATIVE
GESTATIONAL AGE: NORMAL
IN VITRO FERTILIZATION: NORMAL
INSULIN REQ DIABETES: NO
LAST MENSTRUAL PERIOD: NORMAL
MATERNAL AGE AT EDD: 35.5 YR
MATERNAL WEIGHT: 198
MONOCHORIONIC TWINS: NORMAL
NUMBER OF FETUSES: NORMAL
PATIENT WEIGHT UNITS: NORMAL
PATIENT WEIGHT: NORMAL
RACE (MATERNAL): NORMAL
RACE: NORMAL
REPEAT SPECIMEN?: NORMAL
SMOKING: NORMAL
SMOKING: NORMAL
VALPROIC/CARBAMAZEP: NORMAL
ZZ NTE CLEAN UP: HISTORY: NO

## 2023-03-15 ENCOUNTER — HOSPITAL ENCOUNTER (OUTPATIENT)
Dept: INTERVENTIONAL RADIOLOGY/VASCULAR | Age: 33
Discharge: HOME | End: 2023-03-17
Payer: MEDICAID

## 2023-03-15 ENCOUNTER — ROUTINE PRENATAL (OUTPATIENT)
Dept: OBGYN | Age: 33
End: 2023-03-15
Payer: MEDICAID

## 2023-03-15 VITALS
DIASTOLIC BLOOD PRESSURE: 76 MMHG | WEIGHT: 197.4 LBS | HEART RATE: 85 BPM | HEIGHT: 66 IN | SYSTOLIC BLOOD PRESSURE: 120 MMHG | BODY MASS INDEX: 31.72 KG/M2

## 2023-03-15 DIAGNOSIS — Z3A.21 21 WEEKS GESTATION OF PREGNANCY: ICD-10-CM

## 2023-03-15 DIAGNOSIS — Z34.92 PRENATAL CARE, SECOND TRIMESTER: ICD-10-CM

## 2023-03-15 DIAGNOSIS — O09.529 ANTEPARTUM MULTIGRAVIDA OF ADVANCED MATERNAL AGE: ICD-10-CM

## 2023-03-15 DIAGNOSIS — Z34.92 PRENATAL CARE, SECOND TRIMESTER: Primary | ICD-10-CM

## 2023-03-15 PROCEDURE — G8484 FLU IMMUNIZE NO ADMIN: HCPCS | Performed by: ADVANCED PRACTICE MIDWIFE

## 2023-03-15 PROCEDURE — 99214 OFFICE O/P EST MOD 30 MIN: CPT | Performed by: ADVANCED PRACTICE MIDWIFE

## 2023-03-15 PROCEDURE — 1036F TOBACCO NON-USER: CPT | Performed by: ADVANCED PRACTICE MIDWIFE

## 2023-03-15 PROCEDURE — 99213 OFFICE O/P EST LOW 20 MIN: CPT

## 2023-03-15 PROCEDURE — G8419 CALC BMI OUT NRM PARAM NOF/U: HCPCS | Performed by: ADVANCED PRACTICE MIDWIFE

## 2023-03-15 PROCEDURE — 76805 OB US >/= 14 WKS SNGL FETUS: CPT

## 2023-03-15 PROCEDURE — G8427 DOCREV CUR MEDS BY ELIG CLIN: HCPCS | Performed by: ADVANCED PRACTICE MIDWIFE

## 2023-03-15 NOTE — PROGRESS NOTES
S:  Presents for prenatal visit today. Reports feeling well and no concerns. Perceiving fetal movements. O:  MVSS, Afebrile. Abdomen gravid, nontender. S=D, US = 21 Weeks 6 Days +/- 1 Week 4 Days = EDC 2023. .88 gm. +/- 67.18 gm., (1# 0 oz. +/- 2 oz.), 61.9%,   A:  25 yo  at 21 Weeks 1 Day, Prenatal Care Second Trimester, AMA, +FHT's  P:  Education: discussed and reviewed diet with hydration, expect an additional 10# weight gain, PNV and vitamin D3 daily. RTO 4 weeks. Thirty minutes spent in education, evaluation and assessment.

## 2023-04-19 ENCOUNTER — HOSPITAL ENCOUNTER (EMERGENCY)
Age: 33
Discharge: ANOTHER HOSPITAL | End: 2023-04-19
Attending: FAMILY MEDICINE
Payer: MEDICAID

## 2023-04-19 VITALS
SYSTOLIC BLOOD PRESSURE: 144 MMHG | TEMPERATURE: 99.5 F | HEART RATE: 109 BPM | RESPIRATION RATE: 18 BRPM | DIASTOLIC BLOOD PRESSURE: 64 MMHG | OXYGEN SATURATION: 98 %

## 2023-04-19 DIAGNOSIS — R50.9 FEVER, UNSPECIFIED FEVER CAUSE: Primary | ICD-10-CM

## 2023-04-19 DIAGNOSIS — D72.10 EOSINOPHILIA, UNSPECIFIED TYPE: ICD-10-CM

## 2023-04-19 LAB
ALBUMIN SERPL BCP-MCNC: 2.8 GM/DL (ref 3.4–5)
ALP SERPL-CCNC: 62 U/L (ref 46–116)
ALT SERPL W P-5'-P-CCNC: 26 U/L (ref 14–63)
AMORPH SED URNS QL MICRO: ABNORMAL
ANION GAP SERPL CALC-SCNC: 11 MEQ/L (ref 8–16)
AST SERPL W P-5'-P-CCNC: 18 U/L (ref 15–37)
BACTERIA URNS QL MICRO: ABNORMAL
BASOPHILS # BLD: 0.1 % (ref 0–3)
BASOPHILS ABSOLUTE: 0 THOU/MM3 (ref 0–0.1)
BILIRUB SERPL-MCNC: 0.2 MG/DL (ref 0.2–1)
BILIRUB UR QL STRIP.AUTO: NEGATIVE
BUN SERPL-MCNC: 9 MG/DL (ref 7–18)
CALCIUM SERPL-MCNC: 8.9 MG/DL (ref 8.5–10.1)
CASTS #/AREA URNS LPF: ABNORMAL /LPF
CHARACTER UR: CLEAR
CHLORIDE SERPL-SCNC: 100 MEQ/L (ref 98–107)
CO2 SERPL-SCNC: 22 MEQ/L (ref 21–32)
COLOR UR AUTO: YELLOW
CREAT SERPL-MCNC: 0.6 MG/DL (ref 0.6–1.3)
CRYSTALS VITF MICRO: ABNORMAL
EOSINOPHILS ABSOLUTE: 0 THOU/MM3 (ref 0–0.5)
EOSINOPHILS RELATIVE PERCENT: 0 % (ref 0–4)
EPI CELLS #/AREA URNS HPF: ABNORMAL /HPF
FLUAV AG SPEC QL: NEGATIVE
FLUBV AG SPEC QL: NEGATIVE
GFR SERPL CREATININE-BSD FRML MDRD: > 60 ML/MIN/1.73M2
GLUCOSE SERPL-MCNC: 96 MG/DL (ref 74–106)
GLUCOSE UR QL STRIP.AUTO: NEGATIVE MG/DL
HCT VFR BLD CALC: 31.1 % (ref 37–47)
HEMOGLOBIN: 10.4 GM/DL (ref 12–16)
HGB UR STRIP.AUTO-MCNC: NEGATIVE MG/L
IMMATURE GRANS (ABS): 0.11 THOU/MM3 (ref 0–0.07)
IMMATURE GRANULOCYTES: 1 %
KETONES UR QL STRIP.AUTO: ABNORMAL
LACTATE: 0.89 MMOL/L (ref 0.9–1.7)
LEUKOCYTE ESTERASE UR QL STRIP.AUTO: ABNORMAL
LYMPHOCYTES # BLD AUTO: 5.7 % (ref 15–47)
LYMPHOCYTES ABSOLUTE: 1.2 THOU/MM3 (ref 1–4.8)
MCH RBC QN AUTO: 32.3 PG (ref 26–32)
MCHC RBC AUTO-ENTMCNC: 33.4 GM/DL (ref 31–35)
MCV RBC AUTO: 96.6 FL (ref 81–99)
MONOCYTES: 1.2 THOU/MM3 (ref 0.3–1.3)
MONOCYTES: 5.6 % (ref 0–12)
MUCOUS THREADS URNS QL MICRO: ABNORMAL
NITRITE UR QL STRIP.AUTO: NEGATIVE
PDW BLD-RTO: 12.8 % (ref 11.5–14.9)
PH UR STRIP.AUTO: 7 [PH] (ref 5–9)
PLATELET # BLD AUTO: 275 THOU/MM3 (ref 130–400)
PMV BLD AUTO: 9.2 FL (ref 9.4–12.4)
POTASSIUM SERPL-SCNC: 3.2 MEQ/L (ref 3.5–5.1)
PROT SERPL-MCNC: 6.6 GM/DL (ref 6.4–8.2)
PROT UR STRIP.AUTO-MCNC: NEGATIVE MG/DL
RBC # BLD: 3.22 MILL/MM3 (ref 4.1–5.3)
RBC #/AREA URNS HPF: ABNORMAL /HPF
REFLEX TO URINE C & S: ABNORMAL
S PYO AG THROAT QL: NEGATIVE
SARS-COV-2 RDRP RESP QL NAA+PROBE: NOT  DETECTED
SEG NEUTROPHILS: 88.1 % (ref 43–75)
SEGMENTED NEUTROPHILS ABSOLUTE COUNT: 19.2 THOU/MM3 (ref 1.8–7.7)
SODIUM SERPL-SCNC: 133 MEQ/L (ref 136–145)
SP GR UR STRIP.AUTO: 1.02 (ref 1–1.03)
UROBILINOGEN UR STRIP-ACNC: 0.2 EU/DL (ref 0–1)
WBC # BLD: 21.8 THOU/MM3 (ref 4.8–10.8)
WBC # UR STRIP.AUTO: ABNORMAL /HPF

## 2023-04-19 PROCEDURE — 87651 STREP A DNA AMP PROBE: CPT

## 2023-04-19 PROCEDURE — 87804 INFLUENZA ASSAY W/OPTIC: CPT

## 2023-04-19 PROCEDURE — 83605 ASSAY OF LACTIC ACID: CPT

## 2023-04-19 PROCEDURE — 87040 BLOOD CULTURE FOR BACTERIA: CPT

## 2023-04-19 PROCEDURE — 81001 URINALYSIS AUTO W/SCOPE: CPT

## 2023-04-19 PROCEDURE — 87635 SARS-COV-2 COVID-19 AMP PRB: CPT

## 2023-04-19 PROCEDURE — 85025 COMPLETE CBC W/AUTO DIFF WBC: CPT

## 2023-04-19 PROCEDURE — 6370000000 HC RX 637 (ALT 250 FOR IP): Performed by: FAMILY MEDICINE

## 2023-04-19 PROCEDURE — 96361 HYDRATE IV INFUSION ADD-ON: CPT

## 2023-04-19 PROCEDURE — 6360000002 HC RX W HCPCS: Performed by: FAMILY MEDICINE

## 2023-04-19 PROCEDURE — 99285 EMERGENCY DEPT VISIT HI MDM: CPT

## 2023-04-19 PROCEDURE — 87086 URINE CULTURE/COLONY COUNT: CPT

## 2023-04-19 PROCEDURE — 2580000003 HC RX 258: Performed by: FAMILY MEDICINE

## 2023-04-19 PROCEDURE — 36415 COLL VENOUS BLD VENIPUNCTURE: CPT

## 2023-04-19 PROCEDURE — 96374 THER/PROPH/DIAG INJ IV PUSH: CPT

## 2023-04-19 PROCEDURE — 80053 COMPREHEN METABOLIC PANEL: CPT

## 2023-04-19 RX ORDER — 0.9 % SODIUM CHLORIDE 0.9 %
1000 INTRAVENOUS SOLUTION INTRAVENOUS ONCE
Status: COMPLETED | OUTPATIENT
Start: 2023-04-19 | End: 2023-04-19

## 2023-04-19 RX ORDER — ACETAMINOPHEN 500 MG
1000 TABLET ORAL ONCE
Status: COMPLETED | OUTPATIENT
Start: 2023-04-19 | End: 2023-04-19

## 2023-04-19 RX ORDER — ACETAMINOPHEN 500 MG
500 TABLET ORAL EVERY 6 HOURS PRN
COMMUNITY

## 2023-04-19 RX ADMIN — ACETAMINOPHEN 1000 MG: 500 TABLET ORAL at 20:22

## 2023-04-19 RX ADMIN — SODIUM CHLORIDE 1000 ML: 900 INJECTION INTRAVENOUS at 20:20

## 2023-04-19 RX ADMIN — CEFTRIAXONE SODIUM 1000 MG: 1 INJECTION, POWDER, FOR SOLUTION INTRAMUSCULAR; INTRAVENOUS at 21:32

## 2023-04-19 ASSESSMENT — ENCOUNTER SYMPTOMS
SINUS PRESSURE: 0
NAUSEA: 0
COLOR CHANGE: 0
SHORTNESS OF BREATH: 0
COUGH: 0
ABDOMINAL PAIN: 0
SORE THROAT: 0
SINUS PAIN: 0
VOMITING: 0
DIARRHEA: 0

## 2023-04-19 ASSESSMENT — PAIN - FUNCTIONAL ASSESSMENT: PAIN_FUNCTIONAL_ASSESSMENT: 0-10

## 2023-04-19 ASSESSMENT — PAIN DESCRIPTION - LOCATION: LOCATION: GENERALIZED

## 2023-04-19 ASSESSMENT — PAIN DESCRIPTION - DESCRIPTORS: DESCRIPTORS: ACHING

## 2023-04-20 LAB
AMPHETAMINE SCREEN, URINE: NEGATIVE
BANDED NEUTROPHILS RELATIVE PERCENT: 0 % (ref 0–5)
BARBITURATE SCREEN, URINE: NEGATIVE
BASOPHILS %. MANUAL COUNT: 0 % (ref 0–1)
BENZODIAZEPINES, URINE SCREEN: NEGATIVE
CANNABINOID SCREEN URINE: NEGATIVE
COCAINE(METAB.)SCREEN, URINE: NEGATIVE
EOSINOPHILS % MANUAL COUNT: 0 (ref 0–10)
HCT VFR BLD CALC: 28.9 % (ref 37–47)
HEMOGLOBIN: 9.7 (ref 12–16)
LYMPHOCYTES % MANUAL COUNT: 7 % (ref 21–51)
MCH RBC QN AUTO: 32.3 PG (ref 28.5–32.5)
MCHC RBC AUTO-ENTMCNC: 33.4 G/DL (ref 32–37)
MCV RBC AUTO: 96.9 FL (ref 80–94)
MONOCYTES RELATIVE PERCENT: 1 % (ref 2–9)
NEUTROPHILS %. MANUAL COUNT: 92 % (ref 42–75)
OPIATE SCREEN, URINE: NEGATIVE
OXYCODONE SCREEN URINE: NEGATIVE
PDW BLD-RTO: 12.3 % (ref 8.5–15.5)
PHENCYCLIDINE SCREEN URINE: NEGATIVE
PLATELET # BLD: 239 THOU/MM3 (ref 130–400)
RBC: 2.99 M/UL (ref 4.2–5.4)
TRICYCLIC ANTIDEPRESSANTS, UR: NEGATIVE
WBC: 16 THOU/ML3 (ref 4.8–10.8)

## 2023-04-20 NOTE — ED PROVIDER NOTES
[facility]  eMERGENCY dEPARTMENT eNCOUnter          CHIEF COMPLAINT       Chief Complaint   Patient presents with    Fever    Generalized Body Aches       Nurses Notes reviewed and I agree except as noted in the HPI. HISTORY OF PRESENT ILLNESS    Kenyon Davis is a 26 y.o. female who presents with fever, generalized body aches. Symptoms according to patient started today. Denies cough,congestion,sore throat. Denies pain with urination or urinary frequency. Patient is 6 months pregnant. Denies issues with prenatal period. Notes no alleviating measures prior to arrival.          REVIEW OF SYSTEMS     Review of Systems   Constitutional:  Positive for fever. Negative for activity change, appetite change and chills. HENT:  Negative for congestion, ear pain, sinus pressure, sinus pain and sore throat. Respiratory:  Negative for cough and shortness of breath. Gastrointestinal:  Negative for abdominal pain, diarrhea, nausea and vomiting. Genitourinary:  Negative for difficulty urinating, dysuria, flank pain, frequency, vaginal bleeding, vaginal discharge and vaginal pain. Musculoskeletal:  Positive for myalgias. Negative for arthralgias. Skin:  Negative for color change and rash. Neurological:  Negative for light-headedness and headaches. All other systems reviewed and are negative. PAST MEDICAL HISTORY    has a past medical history of Abnormal Pap smear of cervix and Depression. SURGICAL HISTORY      has a past surgical history that includes Colposcopy. CURRENT MEDICATIONS       Previous Medications    ACETAMINOPHEN (TYLENOL) 500 MG TABLET    Take 1 tablet by mouth every 6 hours as needed for Pain    DESONIDE (DESOWEN) 0.05 % CREAM    Apply topically 2 times daily.     OMEPRAZOLE (PRILOSEC) 20 MG DELAYED RELEASE CAPSULE    Take 1 capsule by mouth daily    PRENATAL MV-MIN-FE FUM-FA-DHA (PRENATAL MULTIVITAMIN PLUS DHA) 27-0.8-250 MG CAPS    One tablet daily by mouth

## 2023-04-20 NOTE — ED NOTES
[facility] was called and talked with house supervisor and gave verbal report via the phone.       Ellen Guerrero, RN  04/19/23 [address] RN  04/19/23 5078

## 2023-04-20 NOTE — ED NOTES
Pt stable and left ER via private car as transfer to [address] [place] [address], RN  04/19/23 5289

## 2023-04-21 LAB
BACTERIA UR CULT: ABNORMAL
ORGANISM: ABNORMAL

## 2023-04-22 LAB
BACTERIA BLD AEROBE CULT: NORMAL
BACTERIA BLD AEROBE CULT: NORMAL

## 2023-04-24 ENCOUNTER — TELEPHONE (OUTPATIENT)
Dept: OBGYN | Age: 33
End: 2023-04-24

## 2023-04-24 NOTE — TELEPHONE ENCOUNTER
Patient called to make follow up from ER, patient states she is feeling better and will keep appt on 5/4 with Annika Hardwick

## 2023-04-25 LAB
BACTERIA BLD AEROBE CULT: NORMAL
BACTERIA BLD AEROBE CULT: NORMAL

## 2023-05-04 ENCOUNTER — HOSPITAL ENCOUNTER (OUTPATIENT)
Age: 33
Discharge: HOME | End: 2023-05-04
Payer: MEDICAID

## 2023-05-04 ENCOUNTER — ROUTINE PRENATAL (OUTPATIENT)
Dept: OBGYN | Age: 33
End: 2023-05-04
Payer: MEDICAID

## 2023-05-04 VITALS
SYSTOLIC BLOOD PRESSURE: 122 MMHG | WEIGHT: 209 LBS | BODY MASS INDEX: 33.73 KG/M2 | DIASTOLIC BLOOD PRESSURE: 64 MMHG | HEART RATE: 88 BPM

## 2023-05-04 DIAGNOSIS — Z3A.28 28 WEEKS GESTATION OF PREGNANCY: ICD-10-CM

## 2023-05-04 DIAGNOSIS — E55.9 VITAMIN D DEFICIENCY: ICD-10-CM

## 2023-05-04 DIAGNOSIS — Z34.92 PRENATAL CARE, SECOND TRIMESTER: Primary | ICD-10-CM

## 2023-05-04 DIAGNOSIS — Z34.92 PRENATAL CARE, SECOND TRIMESTER: ICD-10-CM

## 2023-05-04 DIAGNOSIS — O09.529 ANTEPARTUM MULTIGRAVIDA OF ADVANCED MATERNAL AGE: ICD-10-CM

## 2023-05-04 LAB
ABSOLUTE EOS #: 0.09 K/UL (ref 0–0.44)
ABSOLUTE IMMATURE GRANULOCYTE: 0.07 K/UL (ref 0–0.3)
ABSOLUTE LYMPH #: 2.54 K/UL (ref 1.1–3.7)
ABSOLUTE MONO #: 0.65 K/UL (ref 0.1–1.2)
BASOPHILS # BLD: 0 % (ref 0–2)
BASOPHILS ABSOLUTE: <0.03 K/UL (ref 0–0.2)
EOSINOPHILS RELATIVE PERCENT: 1 % (ref 1–4)
GLUCOSE ADMINISTRATION: ABNORMAL
GLUCOSE TOLERANCE SCREEN 50G: 138 MG/DL (ref 70–135)
HCT VFR BLD AUTO: 35.2 % (ref 36.3–47.1)
HGB BLD-MCNC: 11.6 G/DL (ref 11.9–15.1)
IMMATURE GRANULOCYTES: 1 %
LYMPHOCYTES # BLD: 29 % (ref 24–43)
MCH RBC QN AUTO: 32.1 PG (ref 25.2–33.5)
MCHC RBC AUTO-ENTMCNC: 33 G/DL (ref 25.2–33.5)
MCV RBC AUTO: 97.5 FL (ref 82.6–102.9)
MONOCYTES # BLD: 7 % (ref 3–12)
NRBC AUTOMATED: 0 PER 100 WBC
PDW BLD-RTO: 13.1 % (ref 11.8–14.4)
PLATELET # BLD AUTO: 380 K/UL (ref 138–453)
PMV BLD AUTO: 9.5 FL (ref 8.1–13.5)
RBC # BLD: 3.61 M/UL (ref 3.95–5.11)
SEG NEUTROPHILS: 62 % (ref 36–65)
SEGMENTED NEUTROPHILS ABSOLUTE COUNT: 5.52 K/UL (ref 1.5–8.1)
WBC # BLD AUTO: 8.9 K/UL (ref 3.5–11.3)

## 2023-05-04 PROCEDURE — 90715 TDAP VACCINE 7 YRS/> IM: CPT | Performed by: ADVANCED PRACTICE MIDWIFE

## 2023-05-04 PROCEDURE — 1036F TOBACCO NON-USER: CPT | Performed by: ADVANCED PRACTICE MIDWIFE

## 2023-05-04 PROCEDURE — PBSHW TDAP, BOOSTRIX, (AGE 10 YRS+), IM: Performed by: ADVANCED PRACTICE MIDWIFE

## 2023-05-04 PROCEDURE — 36415 COLL VENOUS BLD VENIPUNCTURE: CPT

## 2023-05-04 PROCEDURE — 99214 OFFICE O/P EST MOD 30 MIN: CPT | Performed by: ADVANCED PRACTICE MIDWIFE

## 2023-05-04 PROCEDURE — 82950 GLUCOSE TEST: CPT

## 2023-05-04 PROCEDURE — 85025 COMPLETE CBC W/AUTO DIFF WBC: CPT

## 2023-05-04 PROCEDURE — G8419 CALC BMI OUT NRM PARAM NOF/U: HCPCS | Performed by: ADVANCED PRACTICE MIDWIFE

## 2023-05-04 PROCEDURE — G8427 DOCREV CUR MEDS BY ELIG CLIN: HCPCS | Performed by: ADVANCED PRACTICE MIDWIFE

## 2023-05-04 PROCEDURE — 99214 OFFICE O/P EST MOD 30 MIN: CPT

## 2023-05-04 RX ORDER — OMEPRAZOLE 20 MG/1
20 CAPSULE, DELAYED RELEASE ORAL
Qty: 60 CAPSULE | Refills: 3 | Status: SHIPPED | OUTPATIENT
Start: 2023-05-04

## 2023-05-04 RX ORDER — MELATONIN
2000 DAILY
Qty: 60 TABLET | Refills: 5 | Status: SHIPPED | OUTPATIENT
Start: 2023-05-04

## 2023-05-09 DIAGNOSIS — R73.09 ABNORMAL GLUCOSE TOLERANCE TEST: Primary | ICD-10-CM

## 2023-05-18 ENCOUNTER — ROUTINE PRENATAL (OUTPATIENT)
Dept: OBGYN | Age: 33
End: 2023-05-18
Payer: MEDICAID

## 2023-05-18 VITALS
DIASTOLIC BLOOD PRESSURE: 70 MMHG | WEIGHT: 212.2 LBS | RESPIRATION RATE: 14 BRPM | HEIGHT: 66 IN | OXYGEN SATURATION: 98 % | SYSTOLIC BLOOD PRESSURE: 118 MMHG | HEART RATE: 70 BPM | BODY MASS INDEX: 34.1 KG/M2

## 2023-05-18 DIAGNOSIS — O09.529 ANTEPARTUM MULTIGRAVIDA OF ADVANCED MATERNAL AGE: ICD-10-CM

## 2023-05-18 DIAGNOSIS — Z34.93 PRENATAL CARE, THIRD TRIMESTER: Primary | ICD-10-CM

## 2023-05-18 DIAGNOSIS — Z3A.30 30 WEEKS GESTATION OF PREGNANCY: ICD-10-CM

## 2023-05-18 DIAGNOSIS — E55.9 VITAMIN D DEFICIENCY: ICD-10-CM

## 2023-05-18 PROCEDURE — G8427 DOCREV CUR MEDS BY ELIG CLIN: HCPCS | Performed by: ADVANCED PRACTICE MIDWIFE

## 2023-05-18 PROCEDURE — G8419 CALC BMI OUT NRM PARAM NOF/U: HCPCS | Performed by: ADVANCED PRACTICE MIDWIFE

## 2023-05-18 PROCEDURE — 99214 OFFICE O/P EST MOD 30 MIN: CPT | Performed by: ADVANCED PRACTICE MIDWIFE

## 2023-05-18 PROCEDURE — 1036F TOBACCO NON-USER: CPT | Performed by: ADVANCED PRACTICE MIDWIFE

## 2023-05-18 NOTE — PROGRESS NOTES
S: Presents for prenatal visit today. Reports baby is active and no contractions. Reports GERD daily. Using prilosec on daily and sometimes twice daily. Some tobacco use. O:  MVSS,  Afebrile. Abdomen gravid, nontender. S=D, +FHT's, +FM  A:  27 yo  at 30 Weeks 2 Days SIUP, +FHT's, Tobacco Use  P:  Education: discussed and reviewed FMI, PTL, GERD and may take prilosec 20 mg. BID. Growth US at [address]. Thirty minutes spent in education, evaluation and assessment. 3 H GTT tomorrow.

## 2023-05-19 ENCOUNTER — HOSPITAL ENCOUNTER (OUTPATIENT)
Age: 33
Discharge: HOME | End: 2023-05-19

## 2023-05-19 DIAGNOSIS — R73.09 ABNORMAL GLUCOSE TOLERANCE TEST: ICD-10-CM

## 2023-06-06 ENCOUNTER — HOSPITAL ENCOUNTER (OUTPATIENT)
Dept: ULTRASOUND IMAGING | Age: 33
Discharge: HOME | End: 2023-06-08
Payer: MEDICAID

## 2023-06-06 ENCOUNTER — ROUTINE PRENATAL (OUTPATIENT)
Dept: OBGYN | Age: 33
End: 2023-06-06
Payer: MEDICAID

## 2023-06-06 VITALS
BODY MASS INDEX: 34.58 KG/M2 | WEIGHT: 214.25 LBS | DIASTOLIC BLOOD PRESSURE: 68 MMHG | SYSTOLIC BLOOD PRESSURE: 110 MMHG | HEART RATE: 82 BPM

## 2023-06-06 DIAGNOSIS — O09.529 ANTEPARTUM MULTIGRAVIDA OF ADVANCED MATERNAL AGE: ICD-10-CM

## 2023-06-06 DIAGNOSIS — Z3A.33 33 WEEKS GESTATION OF PREGNANCY: ICD-10-CM

## 2023-06-06 DIAGNOSIS — Z34.93 PRENATAL CARE, THIRD TRIMESTER: Primary | ICD-10-CM

## 2023-06-06 PROCEDURE — 99212 OFFICE O/P EST SF 10 MIN: CPT | Performed by: ADVANCED PRACTICE MIDWIFE

## 2023-06-06 PROCEDURE — G8427 DOCREV CUR MEDS BY ELIG CLIN: HCPCS | Performed by: ADVANCED PRACTICE MIDWIFE

## 2023-06-06 PROCEDURE — 1036F TOBACCO NON-USER: CPT | Performed by: ADVANCED PRACTICE MIDWIFE

## 2023-06-06 PROCEDURE — G8419 CALC BMI OUT NRM PARAM NOF/U: HCPCS | Performed by: ADVANCED PRACTICE MIDWIFE

## 2023-06-06 PROCEDURE — 99214 OFFICE O/P EST MOD 30 MIN: CPT | Performed by: ADVANCED PRACTICE MIDWIFE

## 2023-06-06 PROCEDURE — 76805 OB US >/= 14 WKS SNGL FETUS: CPT

## 2023-06-06 RX ORDER — PNV,CALCIUM 72/IRON/FOLIC ACID 27 MG-1 MG
1 TABLET ORAL DAILY
COMMUNITY
Start: 2023-03-01

## 2023-06-06 NOTE — PROGRESS NOTES
S:  Presents for prenatal visit today. Reports baby is active and prilosec is helping with GERD. Reports getting excited to have baby. Tobacco 5 cig./day. O:  MVSS, Afebrile Abdomen gravid, nontender. US = 35 Weeks 1 Day +/- 2 Weeks 3 Days = EDC 7/10/2023, EFWQ 2483 g. +/- 372.47 g., (5# 8 oz. +/- 13 oz. ), 87.8%, LARRY 14.97 cm.,   A:  25 yo  at 33 Weeks 0 Days sIUP, Obesisty Affecting Pregnancy, Tobacco Use, macrosomia  P:  Education: FMI, PTL, danger S&S, reviewed US. Thirty minutes spent in education, evaluation and assessment. RTO 2 weeks, Genital GBS culture at [place].

## 2023-06-22 ENCOUNTER — HOSPITAL ENCOUNTER (OUTPATIENT)
Age: 33
Setting detail: SPECIMEN
Discharge: HOME | End: 2023-06-22
Payer: MEDICAID

## 2023-06-22 ENCOUNTER — ROUTINE PRENATAL (OUTPATIENT)
Dept: OBGYN | Age: 33
End: 2023-06-22

## 2023-06-22 VITALS
WEIGHT: 215.6 LBS | BODY MASS INDEX: 34.8 KG/M2 | HEART RATE: 88 BPM | DIASTOLIC BLOOD PRESSURE: 68 MMHG | SYSTOLIC BLOOD PRESSURE: 108 MMHG

## 2023-06-22 DIAGNOSIS — O99.019 ANTEPARTUM ANEMIA: ICD-10-CM

## 2023-06-22 DIAGNOSIS — Z3A.35 35 WEEKS GESTATION OF PREGNANCY: ICD-10-CM

## 2023-06-22 DIAGNOSIS — Z34.93 PRENATAL CARE, THIRD TRIMESTER: Primary | ICD-10-CM

## 2023-06-22 DIAGNOSIS — O09.529 ANTEPARTUM MULTIGRAVIDA OF ADVANCED MATERNAL AGE: ICD-10-CM

## 2023-06-22 PROCEDURE — 87081 CULTURE SCREEN ONLY: CPT

## 2023-06-22 PROCEDURE — 86403 PARTICLE AGGLUT ANTBDY SCRN: CPT

## 2023-06-22 RX ORDER — ASCORBIC ACID 250 MG
250 TABLET ORAL NIGHTLY
Qty: 30 TABLET | Refills: 1 | Status: SHIPPED | OUTPATIENT
Start: 2023-06-22

## 2023-06-22 NOTE — PROGRESS NOTES
S: Presents for prenatal visit today Reports baby is active and no contractions. No concerns. O:  MVSS, Afebrile. Abdomen gravid, nontender, cephalic. +FHT's, +FM  A:  25 yo  at 35 Weeks 2 Days SIUP, +FHT's, Anemia in Pregnancy, Tobacco Use, Macrosomia  P:  Education: genital GBS culture, reviewed labor S&S, when to call and how to contact CNM. RTO weekly. Thirty minutes spent in education, evaluation and assessment.

## 2023-06-25 LAB
MICROORGANISM SPEC CULT: NORMAL
SPECIMEN DESCRIPTION: NORMAL

## 2023-06-29 ENCOUNTER — ROUTINE PRENATAL (OUTPATIENT)
Dept: OBGYN | Age: 33
End: 2023-06-29

## 2023-06-29 VITALS
WEIGHT: 217 LBS | BODY MASS INDEX: 35.02 KG/M2 | SYSTOLIC BLOOD PRESSURE: 114 MMHG | DIASTOLIC BLOOD PRESSURE: 68 MMHG | HEART RATE: 88 BPM

## 2023-06-29 DIAGNOSIS — O09.529 ANTEPARTUM MULTIGRAVIDA OF ADVANCED MATERNAL AGE: ICD-10-CM

## 2023-06-29 DIAGNOSIS — Z3A.36 36 WEEKS GESTATION OF PREGNANCY: ICD-10-CM

## 2023-06-29 DIAGNOSIS — Z34.93 PRENATAL CARE, THIRD TRIMESTER: Primary | ICD-10-CM

## 2023-06-29 DIAGNOSIS — O99.019 ANTEPARTUM ANEMIA: ICD-10-CM

## 2023-07-13 ENCOUNTER — ROUTINE PRENATAL (OUTPATIENT)
Dept: OBGYN | Age: 33
End: 2023-07-13

## 2023-07-13 VITALS
BODY MASS INDEX: 35.02 KG/M2 | SYSTOLIC BLOOD PRESSURE: 110 MMHG | DIASTOLIC BLOOD PRESSURE: 74 MMHG | HEART RATE: 97 BPM | WEIGHT: 217 LBS

## 2023-07-13 DIAGNOSIS — Z3A.38 38 WEEKS GESTATION OF PREGNANCY: ICD-10-CM

## 2023-07-13 DIAGNOSIS — Z34.93 PRENATAL CARE, THIRD TRIMESTER: Primary | ICD-10-CM

## 2023-07-13 DIAGNOSIS — O09.529 ANTEPARTUM MULTIGRAVIDA OF ADVANCED MATERNAL AGE: ICD-10-CM

## 2023-07-13 NOTE — PROGRESS NOTES
S: Presents for prenatal visit today. Reports baby is active and increasing frequency of uterine contractions. Ready for baby. O:  MVSS, Afebrile. Abdomen gravid, nontender. S=D, +FHT's, +FM, SVE 2 cm/50%/ballotable and very anterior. A:  25 yo  at 38  Weeks 2 Days SIUP, AMA, +FHT's, Recovered Addict  P:  Education:discussed and reviewed labor S&S, when to call and how to contact CNM. RTO 1 week.

## 2023-07-20 ENCOUNTER — ROUTINE PRENATAL (OUTPATIENT)
Dept: OBGYN | Age: 33
End: 2023-07-20

## 2023-07-20 VITALS
WEIGHT: 219.6 LBS | HEART RATE: 98 BPM | SYSTOLIC BLOOD PRESSURE: 110 MMHG | BODY MASS INDEX: 35.44 KG/M2 | DIASTOLIC BLOOD PRESSURE: 78 MMHG

## 2023-07-20 DIAGNOSIS — Z34.93 PRENATAL CARE, THIRD TRIMESTER: Primary | ICD-10-CM

## 2023-07-20 DIAGNOSIS — Z3A.39 39 WEEKS GESTATION OF PREGNANCY: ICD-10-CM

## 2023-07-20 DIAGNOSIS — O09.529 ANTEPARTUM MULTIGRAVIDA OF ADVANCED MATERNAL AGE: ICD-10-CM

## 2023-07-20 DIAGNOSIS — O48.0 POST TERM PREGNANCY OVER 40 WEEKS: ICD-10-CM

## 2023-07-20 NOTE — PROGRESS NOTES
S:  Presents for prenatal visit today. Reports occasional contractions. Baby is active. No change in vaginal discharge. O:  MVSS, Afebrile. S=D, +FHT's, +FM, SVE 2 cm./80%/-3 ballotable. A:  25 yo  at 39 Weeks 2 Days SIUP, +FHT's  P:  Education: discussed and reviewed labor S&S, when to call and how to contact CNM. Growth US and BPP/NST in 1 week. Thirty minutes spent in education, evaluation and assessment.

## 2023-07-27 ENCOUNTER — HOSPITAL ENCOUNTER (OUTPATIENT)
Age: 33
Setting detail: SPECIMEN
Discharge: HOME | End: 2023-07-27
Payer: MEDICAID

## 2023-07-27 ENCOUNTER — ROUTINE PRENATAL (OUTPATIENT)
Dept: OBGYN | Age: 33
End: 2023-07-27
Payer: MEDICAID

## 2023-07-27 ENCOUNTER — HOSPITAL ENCOUNTER (OUTPATIENT)
Dept: ULTRASOUND IMAGING | Age: 33
Discharge: HOME | End: 2023-07-29
Payer: MEDICAID

## 2023-07-27 VITALS
SYSTOLIC BLOOD PRESSURE: 118 MMHG | WEIGHT: 220.6 LBS | DIASTOLIC BLOOD PRESSURE: 75 MMHG | BODY MASS INDEX: 35.61 KG/M2 | HEART RATE: 93 BPM

## 2023-07-27 DIAGNOSIS — Z34.93 PRENATAL CARE, THIRD TRIMESTER: Primary | ICD-10-CM

## 2023-07-27 DIAGNOSIS — O48.0 POST TERM PREGNANCY OVER 40 WEEKS: ICD-10-CM

## 2023-07-27 DIAGNOSIS — Z3A.40 40 WEEKS GESTATION OF PREGNANCY: ICD-10-CM

## 2023-07-27 DIAGNOSIS — Z34.93 PRENATAL CARE, THIRD TRIMESTER: ICD-10-CM

## 2023-07-27 DIAGNOSIS — O48.0 POST-TERM PREGNANCY, 40-42 WEEKS OF GESTATION: ICD-10-CM

## 2023-07-27 DIAGNOSIS — O09.529 ANTEPARTUM MULTIGRAVIDA OF ADVANCED MATERNAL AGE: ICD-10-CM

## 2023-07-27 LAB
AMPHET UR QL SCN: NEGATIVE
BARBITURATES UR QL SCN: NEGATIVE
BENZODIAZ UR QL: NEGATIVE
CANNABINOIDS UR QL SCN: NEGATIVE
COCAINE UR QL SCN: NEGATIVE
FENTANYL UR QL: NEGATIVE
METHADONE UR QL: NEGATIVE
OPIATES UR QL SCN: NEGATIVE
OXYCODONE UR QL SCN: NEGATIVE
PCP UR QL SCN: NEGATIVE
TEST INFORMATION: NORMAL

## 2023-07-27 PROCEDURE — 59025 FETAL NON-STRESS TEST: CPT | Performed by: ADVANCED PRACTICE MIDWIFE

## 2023-07-27 PROCEDURE — 76819 FETAL BIOPHYS PROFIL W/O NST: CPT

## 2023-07-27 PROCEDURE — G8419 CALC BMI OUT NRM PARAM NOF/U: HCPCS | Performed by: ADVANCED PRACTICE MIDWIFE

## 2023-07-27 PROCEDURE — 80307 DRUG TEST PRSMV CHEM ANLYZR: CPT

## 2023-07-27 PROCEDURE — 1036F TOBACCO NON-USER: CPT | Performed by: ADVANCED PRACTICE MIDWIFE

## 2023-07-27 PROCEDURE — 99214 OFFICE O/P EST MOD 30 MIN: CPT | Performed by: ADVANCED PRACTICE MIDWIFE

## 2023-07-27 PROCEDURE — 76805 OB US >/= 14 WKS SNGL FETUS: CPT

## 2023-07-27 PROCEDURE — G8427 DOCREV CUR MEDS BY ELIG CLIN: HCPCS | Performed by: ADVANCED PRACTICE MIDWIFE

## 2023-07-27 NOTE — PROGRESS NOTES
S: Presents for prenatal visit today. Reports baby is active and occasional uterine contractions. Ready to have baby. No concerns. O:  MVSS, Afebrile. Abdomen gravid, nontender. S=D, +FHT's US BPP = 8/8, LARRY 15.06 cm. EFW 3947 gm. +/- 592.03 gm., (8# 11 oz. +/- 1# 5 oz.), 72.5%, LARRY 15.06 cm., , SVE 2 cm/80%/ballotable  A:  25 yo  at 40 Weeks 2 Days SIUP, AMA, Recovered Drugs Addict, BPP 10/10  P:  Reviewed labor S&S, when to call and how to contact CNM. RTO 1 week. Thirty minutes spent in education, evaluation and assessment.

## 2023-08-01 ENCOUNTER — ROUTINE PRENATAL (OUTPATIENT)
Dept: OBGYN | Age: 33
End: 2023-08-01
Payer: MEDICAID

## 2023-08-01 ENCOUNTER — HOSPITAL ENCOUNTER (OUTPATIENT)
Dept: ULTRASOUND IMAGING | Age: 33
Discharge: HOME | End: 2023-08-03
Payer: MEDICAID

## 2023-08-01 VITALS
HEART RATE: 91 BPM | DIASTOLIC BLOOD PRESSURE: 74 MMHG | SYSTOLIC BLOOD PRESSURE: 116 MMHG | BODY MASS INDEX: 35.99 KG/M2 | WEIGHT: 223 LBS

## 2023-08-01 DIAGNOSIS — O48.0 41 WEEKS GESTATION OF PREGNANCY: ICD-10-CM

## 2023-08-01 DIAGNOSIS — O48.0 POST-TERM PREGNANCY, 40-42 WEEKS OF GESTATION: ICD-10-CM

## 2023-08-01 DIAGNOSIS — O09.529 ANTEPARTUM MULTIGRAVIDA OF ADVANCED MATERNAL AGE: ICD-10-CM

## 2023-08-01 DIAGNOSIS — Z3A.41 41 WEEKS GESTATION OF PREGNANCY: ICD-10-CM

## 2023-08-01 DIAGNOSIS — Z34.93 PRENATAL CARE, THIRD TRIMESTER: Primary | ICD-10-CM

## 2023-08-01 PROCEDURE — 59025 FETAL NON-STRESS TEST: CPT | Performed by: ADVANCED PRACTICE MIDWIFE

## 2023-08-01 PROCEDURE — 1036F TOBACCO NON-USER: CPT | Performed by: ADVANCED PRACTICE MIDWIFE

## 2023-08-01 PROCEDURE — 99214 OFFICE O/P EST MOD 30 MIN: CPT | Performed by: ADVANCED PRACTICE MIDWIFE

## 2023-08-01 PROCEDURE — G8427 DOCREV CUR MEDS BY ELIG CLIN: HCPCS | Performed by: ADVANCED PRACTICE MIDWIFE

## 2023-08-01 PROCEDURE — 76819 FETAL BIOPHYS PROFIL W/O NST: CPT

## 2023-08-01 PROCEDURE — G8419 CALC BMI OUT NRM PARAM NOF/U: HCPCS | Performed by: ADVANCED PRACTICE MIDWIFE

## 2023-08-01 NOTE — PROGRESS NOTES
S: Presents for prenatal visit today. Reports baby is active and feeling as though the baby is moving down. Ready to have baby. O:  MVSS, Afebrile. Abdomen gravid, nontender. S>D, US = BPP 8/8, LARRY 12.51 cm., EFM: , moderate variability, accels present, decels absent, contractions absent, SVE 2 cm/80%-2 st, Stripped membranes  A:  27 yo  at 41 Weeks 0 Days BPP 10/10  P:  Plan for IOL tomorrow at 8 am, discussed and reviewed labor S&S, when to call and how to contact CNM. RTO 2 weeks postpartum Thirty minutes spent in education, evaluation and assessment.

## 2023-08-07 ENCOUNTER — TELEPHONE (OUTPATIENT)
Dept: OBGYN | Age: 33
End: 2023-08-07

## 2023-08-07 NOTE — TELEPHONE ENCOUNTER
Patient called stating she had a fever of 102.1  With C/o  Chills  Headache,  Fatigue,  When asked about c section incision she stated the dressing was still intact and she could not see it. Not drainage coming out of dressing. Writer asked patient to hold while she spoke with Segundo Amezquita regarding symptoms and what she may want her to do. Patient stated she was not going to go back to [facility] due to \"bad experience\" at birth and wanted to go to [facility] again asked if she would wait while she spoke with Segundo Amezquita. Patient asked if symptoms were normal and writer stated no. Patient stated she just wanted to go to [facility] then. Writer told her as long as she was seen somewhere, that Segundo Amezquita would be best, but if she were unwilling to wait then just to go to [facility] that she needed seen.     Patient hung up stating she would go to [facility] and call tomorrow with update

## 2023-08-08 ENCOUNTER — HOSPITAL ENCOUNTER (OUTPATIENT)
Dept: GENERAL RADIOLOGY | Age: 33
Discharge: HOME | DRG: 561 | End: 2023-08-10
Payer: MEDICAID

## 2023-08-08 ENCOUNTER — HOSPITAL ENCOUNTER (INPATIENT)
Age: 33
LOS: 1 days | Discharge: ANOTHER HOSPITAL | DRG: 561 | End: 2023-08-08
Attending: INTERNAL MEDICINE | Admitting: INTERNAL MEDICINE
Payer: MEDICAID

## 2023-08-08 ENCOUNTER — HOSPITAL ENCOUNTER (OUTPATIENT)
Dept: CT IMAGING | Age: 33
Discharge: HOME | DRG: 561 | End: 2023-08-10
Payer: MEDICAID

## 2023-08-08 ENCOUNTER — POSTPARTUM VISIT (OUTPATIENT)
Dept: OBGYN | Age: 33
DRG: 561 | End: 2023-08-08
Payer: MEDICAID

## 2023-08-08 ENCOUNTER — APPOINTMENT (OUTPATIENT)
Dept: GENERAL RADIOLOGY | Age: 33
DRG: 561 | End: 2023-08-08
Attending: INTERNAL MEDICINE
Payer: MEDICAID

## 2023-08-08 ENCOUNTER — HOSPITAL ENCOUNTER (OUTPATIENT)
Dept: INTERVENTIONAL RADIOLOGY/VASCULAR | Age: 33
Discharge: HOME | DRG: 561 | End: 2023-08-10
Payer: MEDICAID

## 2023-08-08 ENCOUNTER — HOSPITAL ENCOUNTER (OUTPATIENT)
Age: 33
Discharge: HOME | DRG: 561 | End: 2023-08-08
Payer: MEDICAID

## 2023-08-08 VITALS
TEMPERATURE: 103.1 F | WEIGHT: 211.5 LBS | SYSTOLIC BLOOD PRESSURE: 139 MMHG | RESPIRATION RATE: 24 BRPM | DIASTOLIC BLOOD PRESSURE: 66 MMHG | OXYGEN SATURATION: 99 % | HEIGHT: 66 IN | BODY MASS INDEX: 33.99 KG/M2 | HEART RATE: 115 BPM

## 2023-08-08 VITALS
OXYGEN SATURATION: 97 % | RESPIRATION RATE: 18 BRPM | WEIGHT: 201.4 LBS | TEMPERATURE: 103.1 F | HEART RATE: 116 BPM | BODY MASS INDEX: 32.37 KG/M2 | DIASTOLIC BLOOD PRESSURE: 75 MMHG | HEIGHT: 66 IN | SYSTOLIC BLOOD PRESSURE: 136 MMHG

## 2023-08-08 DIAGNOSIS — L53.9 LEG ERYTHEMA: ICD-10-CM

## 2023-08-08 PROBLEM — R87.619 ABNORMAL CERVICAL PAPANICOLAOU SMEAR: Status: ACTIVE | Noted: 2023-08-08

## 2023-08-08 PROBLEM — K59.00 CONSTIPATION: Status: ACTIVE | Noted: 2023-08-08

## 2023-08-08 PROBLEM — L03.116 CELLULITIS OF LEFT LOWER LEG: Status: ACTIVE | Noted: 2023-08-08

## 2023-08-08 PROBLEM — R50.9 FEVER: Status: ACTIVE | Noted: 2023-08-08

## 2023-08-08 PROBLEM — F32.A DEPRESSION, UNSPECIFIED: Status: ACTIVE | Noted: 2023-08-08

## 2023-08-08 PROBLEM — B18.2 CHRONIC HEPATITIS C VIRUS INFECTION (HCC): Status: ACTIVE | Noted: 2023-08-08

## 2023-08-08 LAB
ALBUMIN SERPL-MCNC: 3.9 G/DL (ref 3.5–5.2)
ALBUMIN/GLOB SERPL: 1.1 {RATIO} (ref 1–2.5)
ALP SERPL-CCNC: 136 U/L (ref 35–104)
ALT SERPL-CCNC: 42 U/L (ref 5–33)
ANION GAP SERPL CALCULATED.3IONS-SCNC: 14 MMOL/L (ref 9–17)
AST SERPL-CCNC: 59 U/L
BASOPHILS # BLD: 0.03 K/UL (ref 0–0.2)
BASOPHILS # BLD: 0.03 K/UL (ref 0–0.2)
BASOPHILS NFR BLD: 0 % (ref 0–2)
BASOPHILS NFR BLD: 0 % (ref 0–2)
BILIRUB SERPL-MCNC: 0.4 MG/DL (ref 0.3–1.2)
BUN SERPL-MCNC: 11 MG/DL (ref 6–20)
BUN/CREAT SERPL: 16 (ref 9–20)
CALCIUM SERPL-MCNC: 9.6 MG/DL (ref 8.6–10.4)
CHLORIDE SERPL-SCNC: 100 MMOL/L (ref 98–107)
CO2 SERPL-SCNC: 23 MMOL/L (ref 20–31)
CREAT SERPL-MCNC: 0.7 MG/DL (ref 0.5–0.9)
EOSINOPHIL # BLD: 0.06 K/UL (ref 0–0.44)
EOSINOPHIL # BLD: <0.03 K/UL (ref 0–0.44)
EOSINOPHILS RELATIVE PERCENT: 0 % (ref 1–4)
EOSINOPHILS RELATIVE PERCENT: 0 % (ref 1–4)
ERYTHROCYTE [DISTWIDTH] IN BLOOD BY AUTOMATED COUNT: 12.9 % (ref 11.8–14.4)
ERYTHROCYTE [DISTWIDTH] IN BLOOD BY AUTOMATED COUNT: 13 % (ref 11.8–14.4)
GFR SERPL CREATININE-BSD FRML MDRD: >60 ML/MIN/1.73M2
GLUCOSE SERPL-MCNC: 85 MG/DL (ref 70–99)
HCT VFR BLD AUTO: 29.2 % (ref 36.3–47.1)
HCT VFR BLD AUTO: 35.2 % (ref 36.3–47.1)
HGB BLD-MCNC: 10.1 G/DL (ref 11.9–15.1)
HGB BLD-MCNC: 11.7 G/DL (ref 11.9–15.1)
IMM GRANULOCYTES # BLD AUTO: 0.3 K/UL (ref 0–0.3)
IMM GRANULOCYTES # BLD AUTO: 0.45 K/UL (ref 0–0.3)
IMM GRANULOCYTES NFR BLD: 2 %
IMM GRANULOCYTES NFR BLD: 2 %
LACTATE BLDV-SCNC: 0.8 MMOL/L (ref 0.5–1.9)
LYMPHOCYTES NFR BLD: 1.13 K/UL (ref 1.1–3.7)
LYMPHOCYTES NFR BLD: 1.18 K/UL (ref 1.1–3.7)
LYMPHOCYTES RELATIVE PERCENT: 6 % (ref 24–43)
LYMPHOCYTES RELATIVE PERCENT: 8 % (ref 24–43)
MCH RBC QN AUTO: 32.1 PG (ref 25.2–33.5)
MCH RBC QN AUTO: 32.5 PG (ref 25.2–33.5)
MCHC RBC AUTO-ENTMCNC: 33.2 G/DL (ref 25.2–33.5)
MCHC RBC AUTO-ENTMCNC: 34.6 G/DL (ref 25.2–33.5)
MCV RBC AUTO: 93.9 FL (ref 82.6–102.9)
MCV RBC AUTO: 96.4 FL (ref 82.6–102.9)
MONOCYTES NFR BLD: 0.46 K/UL (ref 0.1–1.2)
MONOCYTES NFR BLD: 0.61 K/UL (ref 0.1–1.2)
MONOCYTES NFR BLD: 3 % (ref 3–12)
MONOCYTES NFR BLD: 3 % (ref 3–12)
NEUTROPHILS NFR BLD: 87 % (ref 36–65)
NEUTROPHILS NFR BLD: 89 % (ref 36–65)
NEUTS SEG NFR BLD: 12.93 K/UL (ref 1.5–8.1)
NEUTS SEG NFR BLD: 16.11 K/UL (ref 1.5–8.1)
NRBC BLD-RTO: 0 PER 100 WBC
NRBC BLD-RTO: 0 PER 100 WBC
PLATELET # BLD AUTO: 369 K/UL (ref 138–453)
PLATELET # BLD AUTO: 393 K/UL (ref 138–453)
PMV BLD AUTO: 8.5 FL (ref 8.1–13.5)
PMV BLD AUTO: 9.2 FL (ref 8.1–13.5)
POTASSIUM SERPL-SCNC: 4 MMOL/L (ref 3.7–5.3)
PROT SERPL-MCNC: 7.3 G/DL (ref 6.4–8.3)
RBC # BLD AUTO: 3.11 M/UL (ref 3.95–5.11)
RBC # BLD AUTO: 3.65 M/UL (ref 3.95–5.11)
SARS-COV-2 RDRP RESP QL NAA+PROBE: NOT DETECTED
SODIUM SERPL-SCNC: 137 MMOL/L (ref 135–144)
SPECIMEN DESCRIPTION: NORMAL
WBC OTHER # BLD: 14.9 K/UL (ref 3.5–11.3)
WBC OTHER # BLD: 18.4 K/UL (ref 3.5–11.3)

## 2023-08-08 PROCEDURE — 6370000000 HC RX 637 (ALT 250 FOR IP)

## 2023-08-08 PROCEDURE — 6360000004 HC RX CONTRAST MEDICATION: Performed by: ADVANCED PRACTICE MIDWIFE

## 2023-08-08 PROCEDURE — 36556 INSERT NON-TUNNEL CV CATH: CPT

## 2023-08-08 PROCEDURE — 74178 CT ABD&PLV WO CNTR FLWD CNTR: CPT | Performed by: ADVANCED PRACTICE MIDWIFE

## 2023-08-08 PROCEDURE — 87635 SARS-COV-2 COVID-19 AMP PRB: CPT

## 2023-08-08 PROCEDURE — 2000000000 HC ICU R&B

## 2023-08-08 PROCEDURE — 6370000000 HC RX 637 (ALT 250 FOR IP): Performed by: INTERNAL MEDICINE

## 2023-08-08 PROCEDURE — G8419 CALC BMI OUT NRM PARAM NOF/U: HCPCS | Performed by: ADVANCED PRACTICE MIDWIFE

## 2023-08-08 PROCEDURE — 1036F TOBACCO NON-USER: CPT | Performed by: ADVANCED PRACTICE MIDWIFE

## 2023-08-08 PROCEDURE — 36415 COLL VENOUS BLD VENIPUNCTURE: CPT

## 2023-08-08 PROCEDURE — 2709999900 CT UROGRAM

## 2023-08-08 PROCEDURE — 87040 BLOOD CULTURE FOR BACTERIA: CPT

## 2023-08-08 PROCEDURE — 83605 ASSAY OF LACTIC ACID: CPT

## 2023-08-08 PROCEDURE — 93971 EXTREMITY STUDY: CPT

## 2023-08-08 PROCEDURE — 99215 OFFICE O/P EST HI 40 MIN: CPT | Performed by: ADVANCED PRACTICE MIDWIFE

## 2023-08-08 PROCEDURE — 71045 X-RAY EXAM CHEST 1 VIEW: CPT

## 2023-08-08 PROCEDURE — 2580000003 HC RX 258: Performed by: INTERNAL MEDICINE

## 2023-08-08 PROCEDURE — 2580000003 HC RX 258

## 2023-08-08 PROCEDURE — 99222 1ST HOSP IP/OBS MODERATE 55: CPT

## 2023-08-08 PROCEDURE — G8427 DOCREV CUR MEDS BY ELIG CLIN: HCPCS | Performed by: ADVANCED PRACTICE MIDWIFE

## 2023-08-08 PROCEDURE — 71046 X-RAY EXAM CHEST 2 VIEWS: CPT

## 2023-08-08 PROCEDURE — 85025 COMPLETE CBC W/AUTO DIFF WBC: CPT

## 2023-08-08 PROCEDURE — 80053 COMPREHEN METABOLIC PANEL: CPT

## 2023-08-08 PROCEDURE — 6360000002 HC RX W HCPCS: Performed by: INTERNAL MEDICINE

## 2023-08-08 RX ORDER — SODIUM CHLORIDE 0.9 % (FLUSH) 0.9 %
5-40 SYRINGE (ML) INJECTION EVERY 12 HOURS SCHEDULED
Status: DISCONTINUED | OUTPATIENT
Start: 2023-08-08 | End: 2023-08-09 | Stop reason: HOSPADM

## 2023-08-08 RX ORDER — ASCORBIC ACID 500 MG
250 TABLET ORAL NIGHTLY
Status: DISCONTINUED | OUTPATIENT
Start: 2023-08-08 | End: 2023-08-09 | Stop reason: HOSPADM

## 2023-08-08 RX ORDER — POLYETHYLENE GLYCOL 3350 17 G/17G
17 POWDER, FOR SOLUTION ORAL DAILY PRN
Status: DISCONTINUED | OUTPATIENT
Start: 2023-08-08 | End: 2023-08-09 | Stop reason: HOSPADM

## 2023-08-08 RX ORDER — IBUPROFEN 800 MG/1
800 TABLET ORAL EVERY 8 HOURS
Status: ON HOLD | COMMUNITY
Start: 2023-08-05 | End: 2023-08-10 | Stop reason: HOSPADM

## 2023-08-08 RX ORDER — SODIUM CHLORIDE 0.9 % (FLUSH) 0.9 %
5-40 SYRINGE (ML) INJECTION PRN
Status: DISCONTINUED | OUTPATIENT
Start: 2023-08-08 | End: 2023-08-09 | Stop reason: HOSPADM

## 2023-08-08 RX ORDER — FERROUS SULFATE 325(65) MG
1 TABLET ORAL 2 TIMES DAILY
COMMUNITY
Start: 2023-08-05

## 2023-08-08 RX ORDER — VITAMIN B COMPLEX
2000 TABLET ORAL DAILY
Status: DISCONTINUED | OUTPATIENT
Start: 2023-08-09 | End: 2023-08-09 | Stop reason: HOSPADM

## 2023-08-08 RX ORDER — SODIUM CHLORIDE 9 MG/ML
INJECTION, SOLUTION INTRAVENOUS PRN
Status: DISCONTINUED | OUTPATIENT
Start: 2023-08-08 | End: 2023-08-09 | Stop reason: HOSPADM

## 2023-08-08 RX ORDER — CLINDAMYCIN PHOSPHATE 900 MG/50ML
900 INJECTION INTRAVENOUS EVERY 8 HOURS
Status: DISCONTINUED | OUTPATIENT
Start: 2023-08-08 | End: 2023-08-09 | Stop reason: HOSPADM

## 2023-08-08 RX ORDER — ACETAMINOPHEN 325 MG/1
650 TABLET ORAL EVERY 6 HOURS PRN
Status: DISCONTINUED | OUTPATIENT
Start: 2023-08-08 | End: 2023-08-09 | Stop reason: HOSPADM

## 2023-08-08 RX ORDER — PNV,CALCIUM 72/IRON/FOLIC ACID 27 MG-1 MG
1 TABLET ORAL DAILY
Status: DISCONTINUED | OUTPATIENT
Start: 2023-08-09 | End: 2023-08-09 | Stop reason: HOSPADM

## 2023-08-08 RX ORDER — ACETAMINOPHEN 650 MG/1
650 SUPPOSITORY RECTAL EVERY 6 HOURS PRN
Status: DISCONTINUED | OUTPATIENT
Start: 2023-08-08 | End: 2023-08-09 | Stop reason: HOSPADM

## 2023-08-08 RX ORDER — ONDANSETRON 2 MG/ML
4 INJECTION INTRAMUSCULAR; INTRAVENOUS EVERY 6 HOURS PRN
Status: DISCONTINUED | OUTPATIENT
Start: 2023-08-08 | End: 2023-08-09 | Stop reason: HOSPADM

## 2023-08-08 RX ORDER — IBUPROFEN 800 MG/1
800 TABLET ORAL EVERY 8 HOURS
Status: DISCONTINUED | OUTPATIENT
Start: 2023-08-08 | End: 2023-08-09 | Stop reason: HOSPADM

## 2023-08-08 RX ORDER — FERROUS SULFATE 325(65) MG
325 TABLET ORAL 2 TIMES DAILY
Status: DISCONTINUED | OUTPATIENT
Start: 2023-08-08 | End: 2023-08-09 | Stop reason: HOSPADM

## 2023-08-08 RX ORDER — NICOTINE 21 MG/24HR
1 PATCH, TRANSDERMAL 24 HOURS TRANSDERMAL DAILY
Status: DISCONTINUED | OUTPATIENT
Start: 2023-08-08 | End: 2023-08-09 | Stop reason: HOSPADM

## 2023-08-08 RX ORDER — SODIUM CHLORIDE, SODIUM LACTATE, POTASSIUM CHLORIDE, CALCIUM CHLORIDE 600; 310; 30; 20 MG/100ML; MG/100ML; MG/100ML; MG/100ML
INJECTION, SOLUTION INTRAVENOUS CONTINUOUS
Status: DISCONTINUED | OUTPATIENT
Start: 2023-08-08 | End: 2023-08-09 | Stop reason: HOSPADM

## 2023-08-08 RX ORDER — ONDANSETRON 4 MG/1
4 TABLET, ORALLY DISINTEGRATING ORAL EVERY 8 HOURS PRN
Status: DISCONTINUED | OUTPATIENT
Start: 2023-08-08 | End: 2023-08-09 | Stop reason: HOSPADM

## 2023-08-08 RX ORDER — ACETAMINOPHEN 500 MG
1000 TABLET ORAL ONCE
Status: COMPLETED | OUTPATIENT
Start: 2023-08-08 | End: 2023-08-08

## 2023-08-08 RX ADMIN — IBUPROFEN 800 MG: 800 TABLET, FILM COATED ORAL at 22:16

## 2023-08-08 RX ADMIN — Medication 10 ML: at 21:49

## 2023-08-08 RX ADMIN — OXYCODONE HYDROCHLORIDE AND ACETAMINOPHEN 250 MG: 500 TABLET ORAL at 22:16

## 2023-08-08 RX ADMIN — IOPAMIDOL 120 ML: 755 INJECTION, SOLUTION INTRAVENOUS at 14:31

## 2023-08-08 RX ADMIN — ACETAMINOPHEN 650 MG: 325 TABLET ORAL at 23:18

## 2023-08-08 RX ADMIN — SODIUM CHLORIDE, POTASSIUM CHLORIDE, SODIUM LACTATE AND CALCIUM CHLORIDE: 600; 310; 30; 20 INJECTION, SOLUTION INTRAVENOUS at 18:56

## 2023-08-08 RX ADMIN — GENTAMICIN SULFATE 360.4 MG: 40 INJECTION, SOLUTION INTRAMUSCULAR; INTRAVENOUS at 22:05

## 2023-08-08 RX ADMIN — FERROUS SULFATE TAB 325 MG (65 MG ELEMENTAL FE) 325 MG: 325 (65 FE) TAB at 22:16

## 2023-08-08 RX ADMIN — ACETAMINOPHEN 1000 MG: 500 TABLET ORAL at 17:50

## 2023-08-08 ASSESSMENT — PAIN DESCRIPTION - LOCATION
LOCATION: HEAD
LOCATION: HEAD
LOCATION: LEG

## 2023-08-08 ASSESSMENT — PAIN DESCRIPTION - ORIENTATION
ORIENTATION: LEFT
ORIENTATION: RIGHT;LEFT
ORIENTATION: RIGHT;LEFT

## 2023-08-08 ASSESSMENT — PAIN SCALES - GENERAL
PAINLEVEL_OUTOF10: 8
PAINLEVEL_OUTOF10: 8
PAINLEVEL_OUTOF10: 7

## 2023-08-08 ASSESSMENT — ENCOUNTER SYMPTOMS
RESPIRATORY NEGATIVE: 1
EYES NEGATIVE: 1
GASTROINTESTINAL NEGATIVE: 1

## 2023-08-08 ASSESSMENT — PAIN DESCRIPTION - DESCRIPTORS
DESCRIPTORS: ACHING
DESCRIPTORS: THROBBING
DESCRIPTORS: THROBBING

## 2023-08-08 NOTE — PROGRESS NOTES
Taken per wheelchair with  and baby present to ER for registration. Then taken up elevator to PCU where car ewas handed over to [facility], report given.
Procedures    CT UROGRAM     Standing Status:   Future     Number of Occurrences:   1     Standing Expiration Date:   8/8/2024     Order Specific Question:   Reason for exam:     Answer:   flow, and structural integrity kidneys, ureters, bladder,    XR CHEST STANDARD (2 VW)     Standing Status:   Future     Number of Occurrences:   1     Standing Expiration Date:   8/8/2024     Order Specific Question:   Reason for exam:     Answer:   AP and LAT View    CBC with Auto Differential     Standing Status:   Future     Number of Occurrences:   1     Standing Expiration Date:   8/8/2024    Comprehensive Metabolic Panel     Standing Status:   Future     Number of Occurrences:   1     Standing Expiration Date:   8/8/2024    Vascular duplex lower extremity venous left     Standing Status:   Future     Number of Occurrences:   1     Standing Expiration Date:   8/8/2024      Ds'd with collaborating MD< Dr. Angy Thompson, reviewed complete case, labs and imaging, recommends admission, antibiotics ampicillan 2 gm Q6H and gentamicin dosed by pharmacy. IV  ml/H and Straight Cath UA and culture. and may continue to breastfeed infant. He will attend to patient tonight and patient covered by hospitalist service. I spoke with Dr. Ervin Mckeon and report given. Sixty minutes spent in evaluation, assessment and education.

## 2023-08-08 NOTE — PROGRESS NOTES
Pharmacy Note  Aminoglycoside Consult    Tye Blake is a 26 y.o. female ordered gentamicin for SSTI; consult received from Dr. Alexandra Santacruz to manage therapy. Also receiving ampicillin. Patient Active Problem List   Diagnosis    Vitamin D deficiency    Hepatitis C antibody positive in blood    Infection in pregnancy, postpartum condition       Allergies:  Nickel     Temp max:     Recent Labs     08/08/23  1211   CREATININE 0.7       Recent Labs     08/08/23  1211   WBC 14.9*       No intake or output data in the 24 hours ending 08/08/23 1747    Culture Date      Source                 Results      Height:   Ht Readings from Last 1 Encounters:   08/08/23 5' 6\" (1.676 m)     Weight:  Wt Readings from Last 1 Encounters:   08/08/23 201 lb 6.4 oz (91.4 kg)     Estimated Creatinine Clearance: 128 mL/min (based on SCr of 0.7 mg/dL). Estimated CrCl using Ideal Body Weight:  mL/min (based on IBW  kg)    Assessment/Plan:  Give 5 mg/kg based on adjusted body weight. Check random level tomorrow AM.      Thank you for the consult. Will continue to follow.       Valerie LiraD, BCPS 8/8/2023 5:48 PM

## 2023-08-09 ENCOUNTER — HOSPITAL ENCOUNTER (INPATIENT)
Age: 33
LOS: 1 days | Discharge: HOME | End: 2023-08-10
Attending: STUDENT IN AN ORGANIZED HEALTH CARE EDUCATION/TRAINING PROGRAM | Admitting: STUDENT IN AN ORGANIZED HEALTH CARE EDUCATION/TRAINING PROGRAM
Payer: MEDICAID

## 2023-08-09 PROBLEM — L03.90 CELLULITIS, STREPTOCOCCAL: Status: ACTIVE | Noted: 2023-08-09

## 2023-08-09 PROBLEM — B95.5 CELLULITIS, STREPTOCOCCAL: Status: ACTIVE | Noted: 2023-08-09

## 2023-08-09 PROBLEM — A41.9 SEPSIS (HCC): Status: ACTIVE | Noted: 2023-08-09

## 2023-08-09 LAB
LACTIC ACID, SEPSIS WHOLE BLOOD: 1 MMOL/L (ref 0.5–1.9)
LACTIC ACID, SEPSIS WHOLE BLOOD: 1.5 MMOL/L (ref 0.5–1.9)

## 2023-08-09 PROCEDURE — 6360000002 HC RX W HCPCS: Performed by: NURSE PRACTITIONER

## 2023-08-09 PROCEDURE — 36415 COLL VENOUS BLD VENIPUNCTURE: CPT

## 2023-08-09 PROCEDURE — 6370000000 HC RX 637 (ALT 250 FOR IP): Performed by: NURSE PRACTITIONER

## 2023-08-09 PROCEDURE — 2580000003 HC RX 258: Performed by: STUDENT IN AN ORGANIZED HEALTH CARE EDUCATION/TRAINING PROGRAM

## 2023-08-09 PROCEDURE — 6370000000 HC RX 637 (ALT 250 FOR IP): Performed by: INTERNAL MEDICINE

## 2023-08-09 PROCEDURE — 99222 1ST HOSP IP/OBS MODERATE 55: CPT | Performed by: STUDENT IN AN ORGANIZED HEALTH CARE EDUCATION/TRAINING PROGRAM

## 2023-08-09 PROCEDURE — 83605 ASSAY OF LACTIC ACID: CPT

## 2023-08-09 PROCEDURE — 87040 BLOOD CULTURE FOR BACTERIA: CPT

## 2023-08-09 PROCEDURE — 99254 IP/OBS CNSLTJ NEW/EST MOD 60: CPT | Performed by: INTERNAL MEDICINE

## 2023-08-09 PROCEDURE — 2060000000 HC ICU INTERMEDIATE R&B

## 2023-08-09 PROCEDURE — 2580000003 HC RX 258: Performed by: NURSE PRACTITIONER

## 2023-08-09 PROCEDURE — 6360000002 HC RX W HCPCS: Performed by: STUDENT IN AN ORGANIZED HEALTH CARE EDUCATION/TRAINING PROGRAM

## 2023-08-09 RX ORDER — SODIUM CHLORIDE 0.9 % (FLUSH) 0.9 %
5-40 SYRINGE (ML) INJECTION EVERY 12 HOURS SCHEDULED
Status: DISCONTINUED | OUTPATIENT
Start: 2023-08-09 | End: 2023-08-10 | Stop reason: HOSPADM

## 2023-08-09 RX ORDER — SODIUM CHLORIDE 0.9 % (FLUSH) 0.9 %
5-40 SYRINGE (ML) INJECTION PRN
Status: DISCONTINUED | OUTPATIENT
Start: 2023-08-09 | End: 2023-08-10 | Stop reason: HOSPADM

## 2023-08-09 RX ORDER — VITAMIN A, ASCORBIC ACID, CHOLECALCIFEROL, .ALPHA.-TOCOPHEROL ACETATE, DL-, THIAMINE MONONITRATE, RIBOFLAVIN, NIACINAMIDE, PYRIDOXINE HYDROCHLORIDE, FOLIC ACID, CYANOCOBALAMIN, CALCIUM CARBONATE, IRON, ZINC OXIDE, AND CUPRIC OXIDE 4000; 120; 400; 22; 1.84; 3; 20; 10; 1; 12; 200; 29; 25; 2 [IU]/1; MG/1; [IU]/1; [IU]/1; MG/1; MG/1; MG/1; MG/1; MG/1; UG/1; MG/1; MG/1; MG/1; MG/1
1 TABLET ORAL DAILY
Status: DISCONTINUED | OUTPATIENT
Start: 2023-08-09 | End: 2023-08-10 | Stop reason: HOSPADM

## 2023-08-09 RX ORDER — ACETAMINOPHEN 325 MG/1
650 TABLET ORAL EVERY 6 HOURS PRN
Status: DISCONTINUED | OUTPATIENT
Start: 2023-08-09 | End: 2023-08-10 | Stop reason: HOSPADM

## 2023-08-09 RX ORDER — SODIUM CHLORIDE 9 MG/ML
INJECTION, SOLUTION INTRAVENOUS CONTINUOUS
Status: DISCONTINUED | OUTPATIENT
Start: 2023-08-09 | End: 2023-08-10

## 2023-08-09 RX ORDER — LINEZOLID 600 MG/1
600 TABLET, FILM COATED ORAL 2 TIMES DAILY
Qty: 10 TABLET | Refills: 0 | Status: SHIPPED | OUTPATIENT
Start: 2023-08-09 | End: 2023-08-14

## 2023-08-09 RX ORDER — VITAMIN B COMPLEX
2000 TABLET ORAL DAILY
Status: DISCONTINUED | OUTPATIENT
Start: 2023-08-09 | End: 2023-08-10 | Stop reason: HOSPADM

## 2023-08-09 RX ORDER — LINEZOLID 600 MG/1
600 TABLET, FILM COATED ORAL EVERY 12 HOURS SCHEDULED
Status: DISCONTINUED | OUTPATIENT
Start: 2023-08-09 | End: 2023-08-10 | Stop reason: HOSPADM

## 2023-08-09 RX ORDER — LANOLIN ALCOHOL/MO/W.PET/CERES
325 CREAM (GRAM) TOPICAL 2 TIMES DAILY
Status: DISCONTINUED | OUTPATIENT
Start: 2023-08-09 | End: 2023-08-10 | Stop reason: HOSPADM

## 2023-08-09 RX ORDER — SODIUM CHLORIDE 9 MG/ML
INJECTION, SOLUTION INTRAVENOUS PRN
Status: DISCONTINUED | OUTPATIENT
Start: 2023-08-09 | End: 2023-08-10 | Stop reason: HOSPADM

## 2023-08-09 RX ORDER — ACETAMINOPHEN 650 MG/1
650 SUPPOSITORY RECTAL EVERY 6 HOURS PRN
Status: DISCONTINUED | OUTPATIENT
Start: 2023-08-09 | End: 2023-08-10 | Stop reason: HOSPADM

## 2023-08-09 RX ORDER — PNV,CALCIUM 72/IRON/FOLIC ACID 27 MG-1 MG
1 TABLET ORAL DAILY
Status: DISCONTINUED | OUTPATIENT
Start: 2023-08-09 | End: 2023-08-09

## 2023-08-09 RX ORDER — ENOXAPARIN SODIUM 100 MG/ML
40 INJECTION SUBCUTANEOUS DAILY
Status: DISCONTINUED | OUTPATIENT
Start: 2023-08-09 | End: 2023-08-10 | Stop reason: HOSPADM

## 2023-08-09 RX ADMIN — ENOXAPARIN SODIUM 40 MG: 40 INJECTION SUBCUTANEOUS at 10:15

## 2023-08-09 RX ADMIN — CEFEPIME 2000 MG: 2 INJECTION, POWDER, FOR SOLUTION INTRAVENOUS at 01:12

## 2023-08-09 RX ADMIN — CEFEPIME 2000 MG: 2 INJECTION, POWDER, FOR SOLUTION INTRAVENOUS at 14:53

## 2023-08-09 RX ADMIN — Medication 1 TABLET: at 20:27

## 2023-08-09 RX ADMIN — SODIUM CHLORIDE, PRESERVATIVE FREE 10 ML: 5 INJECTION INTRAVENOUS at 20:27

## 2023-08-09 RX ADMIN — LINEZOLID 600 MG: 600 TABLET, FILM COATED ORAL at 21:09

## 2023-08-09 RX ADMIN — SODIUM CHLORIDE, PRESERVATIVE FREE 10 ML: 5 INJECTION INTRAVENOUS at 10:15

## 2023-08-09 RX ADMIN — SODIUM CHLORIDE: 9 INJECTION, SOLUTION INTRAVENOUS at 19:38

## 2023-08-09 RX ADMIN — Medication 2000 UNITS: at 20:27

## 2023-08-09 RX ADMIN — FERROUS SULFATE TAB EC 325 MG (65 MG FE EQUIVALENT) 325 MG: 325 (65 FE) TABLET DELAYED RESPONSE at 20:27

## 2023-08-09 RX ADMIN — VANCOMYCIN HYDROCHLORIDE 1250 MG: 1.25 INJECTION, POWDER, LYOPHILIZED, FOR SOLUTION INTRAVENOUS at 15:01

## 2023-08-09 RX ADMIN — SODIUM CHLORIDE: 9 INJECTION, SOLUTION INTRAVENOUS at 00:56

## 2023-08-09 RX ADMIN — ACETAMINOPHEN 650 MG: 325 TABLET ORAL at 19:37

## 2023-08-09 ASSESSMENT — PAIN DESCRIPTION - ORIENTATION: ORIENTATION: LEFT

## 2023-08-09 ASSESSMENT — PAIN SCALES - GENERAL: PAINLEVEL_OUTOF10: 3

## 2023-08-09 ASSESSMENT — PAIN DESCRIPTION - LOCATION: LOCATION: LEG

## 2023-08-09 ASSESSMENT — PAIN DESCRIPTION - DESCRIPTORS: DESCRIPTORS: TENDER;DISCOMFORT

## 2023-08-09 NOTE — PROGRESS NOTES
[facility])  Occupational Therapy Not Seen Note    DATE: 2023    NAME: Tera Iverson  MRN: [id number]   : 1990      Patient not seen this date for Occupational Therapy due to:    Per pt, patient independent with ADLs and functional tasks with no acute OT needs. Will defer OT evaluation at this time. Please reorder OT if future needs arise.        Electronically signed by Maridee Primrose, OTR/L on 2023 at 11:34 AM

## 2023-08-09 NOTE — H&P
[facility]  Office: [id number], DO, Oscar Curet, DO, Radu Bassam, DO, Valery Sammamish Blood, DO, Carmen Lopes MD, aBrt Townsend MD, Marilee Jamison MD, Nehemias Zendejas MD,  Kymberly Okeefe MD, Vesta Martinez MD, Sonia Pitts, DO, Tani Higgins MD,  Deny Gaines MD, Valerie Glynn MD, Dayanara Watson DO, Boom Coleman MD,  Gaudencio Mckeon DO, Neal Ochoa MD, Dilip Marie MD, Iwona Kim MD, Jj Noriega MD,  Kristy Domingo MD, Tai Catalan MD, Madelin Banuelos MD, Dieudonne Mike DO, Philis Halsted, MD,  Brook Perez MD, Jay Gipson, CNP,  Arabella Barker, CNP, Patsy Jones, CNP, Robbie Flood, CNP,  Cheryle Balding, [facility] [place] Valdemar Peabody, CNP, Vincent Fuentes, CNP, Adali Lester, CNP, Alisha Red, CNP, Celia Gavin, CNP, Bryce Masters PA-C, Doroteo Coulter, [facility], Tommie Haynes, CNP, Lina Schilling, [address] [place] [address]    HISTORY AND PHYSICAL EXAMINATION            Date:   8/9/2023  Patient name:  Milana Marks  Date of admission:  8/9/2023 12:42 AM  MRN:   [id number]  Account:  [de-identified]  YOB: 1990  PCP:    PRINCESS Ortez CNP  Room:   2011/2011-01  Code Status:    Full Code    Chief Complaint:     Fever, chills    History Obtained From:     patient, electronic medical record    History of Present Illness:     Milana Marks is a 26 y.o. Non- / non  female who presents with fever. and is admitted to the hospital for the management of Sepsis [facility]). 40-year-old female presents as a transfer from [facility] with concern for left lower extremity cellulitis. Patient is 6 days postpartum. Patient had a cut on her left foot toe and was itching over the last few days. She noticed redness, swelling, pain going on to the leg. Patient started having fever, chills, Tmax of 105.   Patient was seen by OB/GYN, started on IV antibiotics and was 8/9/2023 Yes    Chronic hepatitis C virus infection ([address]) 8/9/2023 Yes       Plan:     Patient status inpatient in the Progressive Unit/Step down    Continue cefepime, add vancomycin  Pharmacy to dose vancomycin  ID consulted  Follow blood cultures  CT urogram shows mild to moderate hydroureteronephrosis likely secondary to postpartum state, no intervention per OB/GYN  GYN signed off  Monitor white count and fever  Check labs in am  No h/o drug use in 6 yrs  Counseled to quit smoking  Chronic hep c, follow pcp, will give gi referral    Consultations:   PHARMACY TO DOSE VANCOMYCIN  IP CONSULT TO INFECTIOUS DISEASES  IP CONSULT TO OB GYN     Patient is admitted as inpatient status because of co-morbidities listed above, severity of signs and symptoms as outlined, requirement for current medical therapies and most importantly because of direct risk to patient if care not provided in a hospital setting. Expected length of stay > 48 hours.     Wiliam Brito MD  8/9/2023  1:41 PM    Copy sent to Dr. Pavan Bonner, APRN - CNP

## 2023-08-09 NOTE — H&P
HOSPITALIST ADMISSION H&P      REASON FOR ADMISSION:  Cellulitis LLE, Fever, Infection in pregnancy-postpartum condition  ESTIMATED LENGTH OF STAY:> 2 midnights, 3-5 days    ATTENDING/ADMITTING PHYSICIAN: Jules Connolly MD   PCP: PRINCESS Domingo CNP    HISTORY OF PRESENT ILLNESS:      The patient is a 26 y.o. female patient of PRINCESS Domingo CNP who presents from [facility] with c/o fever and chills that began last night. Patient reports that her  c/o her feeling hot to touch last night. Patient today with swelling, redness, and warmth from foot to shin on LLE. On arrival to unit temp 105.1. C/O pain to LLE 5/10. C/O generalized not feeling well and tired, but feels better after temp down to 103. 1. Denies nausea, vomiting, trouble urinating, constipation, or diarrhea. Prior history of IV drug use, IV access difficult to obtain, ER MD to floor to attempt central line access for labs and IV antibiotics. Wounds and LDAs present prior to admission: LLE great toe scabbed area, redness/warmth and swelling, Suprapubic incision CDI and healing. See below for additional PMH. Patient qtgd-kbifyhaglk-meoivfal-available records reviewed, including, but not limited to ER records, imaging results, lab results, office records, personal records, and OARRS -- no signs of abuse or diversion. Past Medical History:   Diagnosis Date    Abnormal Pap smear of cervix     at 19years of age    Chronic hepatitis C virus infection ([facility]) 8/8/2023    Depression            Past Surgical History:   Procedure Laterality Date    COLPOSCOPY         Medications Prior to Admission:    Medications Prior to Admission: ibuprofen (ADVIL;MOTRIN) 800 MG tablet, Take 1 tablet by mouth in the morning and 1 tablet at noon and 1 tablet in the evening.   FEROSUL 325 (65 Fe) MG tablet, Take 1 tablet by mouth 2 times daily  ascorbic acid (V-R VITAMIN C) 250 MG tablet, Take 1 tablet by mouth nightly Take with

## 2023-08-09 NOTE — PROGRESS NOTES
Physical Therapy        Physical Therapy Cancel Note      DATE: 2023    NAME: Luz Marina Restrepo  MRN: [id number]   : 1990      Patient not seen this date for Physical Therapy due to:    Patient independent with functional mobility. Will defer PT evaluation at this time. Pt educated on purpose of PT eval, POC, and importance of mobility. Pt verbalizes no concerns in regards to functional mobility upon discharge. Please reorder PT if future needs arise.        Electronically signed by Kory Littlejohn PT on 2023 at 2:16 PM

## 2023-08-09 NOTE — CARE COORDINATION
08/09/23 1246   Readmission Assessment   Number of Days since last admission? 1-7 days  (not a readmission, transferred from [facility])   Previous Disposition Other (comment)   Who is being Interviewed Patient   What was the patient's/caregiver's perception as to why they think they needed to return back to the hospital? Other (Comment)   Did you visit your Primary Care Physician after you left the hospital, before you returned this time? Yes   Did you see a specialist, such as Cardiac, Pulmonary, Orthopedic Physician, etc. after you left the hospital? Other (Comment)   Who advised the patient to return to the hospital? Other (Comment)   Does the patient report anything that got in the way of taking their medications? No   In our efforts to provide the best possible care to you and others like you, can you think of anything that we could have done to help you after you left the hospital the first time, so that you might not have needed to return so soon?  Other (Comment)

## 2023-08-09 NOTE — CONSULTS
OBGYN Attending Note    26year old  S/P Primary C/S 1 week ago admitted with high fever and left lower extremity cellulitis that has worsened in the last 2 days. Left Foot is red, hot, swollen up to half way up the leg. She has tenderness in the left thigh as well. She had a duplex scan of left lower extremity earlier today that showed no DVT or greater saphenous clot. She also had a CT urogram earlier today that shows mild hydroureteronephrosis. She has an enlarged uterus and some gas in there. There is a 3cm node in her left inguinal area. She has been voiding and moving her bowels. Temp today was up to 105.1 and currently is 103F. WBC is 14.9 and HCT  35. Although she has a small IV in her right arm, we are unable to do blood cultures or start antibiotics. Patient is nursing, but has no complaints of breast problems. Her incision is intact and looks benign. Uterus is enlarged but nontender. She is not bleeding excessively. She received IV Ancef before and after her C/S. Intraoperatively there was an intraabdominal  structure  from upper abdomen that inserted into anterior bladder that appeared to be an adhesion; and I cauterized and cut it. It appeared to be tubular so I sent it to path. Pathologist said that it had urothelium with pinpoint lumen. They felt it was a urachal in origin. Followup CT urogram x2 has not demonstrated any urologic abnormalities. PMH- h/o drug addiction- patient has no peripheral access. HepC + but apparently has negative titer  Surgery C/S at [facility]  1 weeks ago. Allergy to nickel  Exam:  T-103.1  P- 120   Chest is clear  Cardiac- Tachycardic without extra sounds or murmurs  Abdomen is soft  Uterus is soft and non tender  Incision is clean and dry-healing well  Extremities- left leg(lower half) is red and hot,swollen. Scab on big toe  Left thigh is tender medially with no discoloration.   Tender left inguinal region  Right lower extremity is normal.

## 2023-08-09 NOTE — CONSULTS
[facility] - Initial Consult Note  Today's Date and Time: 8/9/2023, 2:18 PM    Impression :   Left Lower Extremity Cellulitis  Most likely Streptococcal in nature  S/p LTCS on 8/3/23  Fever  Leucocytosis  History of IV Drug abuse  Chronic Hep C Infection    Recommendations:   Linezolid 600 mg po BID x 7 days. Patient will not lactate until antibiotic treatment is completed    Medical Decision Making/Summary/Discussion:8/9/2023       Infection Control Recommendations   [place] Precautions    Antimicrobial Stewardship Recommendations     Simplification of therapy  Targeted therapy  IV to oral conversion    Coordination of Outpatient Care:   Estimated Length of IV antimicrobials:TBD  Patient will need Midline Catheter Insertion: TBD  Patient will need PICC line Insertion:TBD  Patient will need: Home IV , [address] [facility],  SNF,  LTAC: TBD  Patient will need outpatient wound care:No    Chief complaint/reason for consultation:   Left Lower Extremity Cellulitis    History of Present Illness:   Cade Stoddard is a  26y.o.-year-old female who was initially admitted on 8/9/2023. Patient seen at the request of Dr. Elizabeth Singh. INITIAL HISTORY:    Patient gave birth via Caesarean Section on 8/3/23. Patient developed high grade fever (Tmax 105) and left lower leg redness,swelling and pain 8/7/23. She went to [facility]. She reported having a cut on her left toe few months ago. Her WBC was raised. Duplex did not show any clot. CT urogram revealed mild hydroureteronephrosis and Left inguinal lymphadenopathy. Because of her previous drug history she had minimal peripheral access. They could not get a blood culture to start antibiotics. Therefore,transferred to [address] [place] [address]. At [facility],patient underwent central line placement. Blood culture was sent. She was started on Cefepime and Vancomycin.     ID was consulted for left lower leg cellulitis    CURRENT EVALUATION and discussed with patient. Labs, medications, radiologic studies were reviewed with personal review of films  Large amounts of data were reviewed  Discussed with nursing Staff, Discharge planner  Infection Control and Prevention measures reviewed  All prior entries were reviewed  Administer medications as ordered  Prognosis: [place]  Discharge planning reviewed  Follow up as outpatient.     Cori Sr MD.    Pager: [phone] - Office: [phone]

## 2023-08-09 NOTE — CONSULTS
[address]    Patient Name: Madeline Butts     Patient : 1990  Room/Bed:   Admission Date/Time: 2023 12:42 AM  Primary Care Physician: PRINCESS Spencer CNP    Consulting Provider: Shelia Camacho  Reason for Consult: postpartum    CC: Fevers/chills at home    HPI: Madeline Butts is a 26 y.o. female  presents as a transfer from [facility] for concerns for left lower extremity cellulitis. Patient states that she had a cut on her left toe for a few months and recently noticed increase redness and warmth to the area for a few days. Fevers/chills at home also started a few days ago. She was tachycardiac prior to transfer and febrile with highest recorded temperature of 105.1. Last fever was 103.1 @ 1957 last night. Of note, patient is post op day 6 from primary CS at an outlying facility. She reports doing well PP and Postop. Is breast feeding and pumping at bedside. She reports lochia is light. Denies any abnormal or malodorous vaginal tenderness. Denies any abdominal pain. She has a hx IV drug use 6 years ago and has been sober since. She is doing well with her sobriety. Patient has two negative urine drug screens this year. Most recent 2023. Her CBC was significant for leukocytosis with highest recorded WBC of 18.4. Lactic acid within normal limits. Blood cultures x2 showed no growth to date. COVID negative. CXR negative x2. LLE doppler showed no evidence of DVT, however did show left inguinal lymphadenopathy. CT ureogram showed enlarged uterus with small amount of hemorrhage and gas secondary to PP state. Patient's last menstrual period was 10/18/2022 (exact date). REVIEW OF SYSTEMS:   A minimum of an eleven point review of systems was completed.     Constitutional: positive fever/chills  HEENT: negative visual disturbances, negative headaches  Respiratory: negative dyspnea, negative cough  Cardiovascular: negative chest pain,

## 2023-08-09 NOTE — CARE COORDINATION
Case Management Assessment  Initial Evaluation    Date/Time of Evaluation: 8/9/2023 12:30 PM  Assessment Completed by: Nette Gaona RN    If patient is discharged prior to next notation, then this note serves as note for discharge by case management. Patient Name: Odalis Motley                   YOB: 1990  Diagnosis: Sepsis [facility]) [A41.9]                   Date / Time: 8/9/2023 12:42 AM    Patient Admission Status: Inpatient   Readmission Risk (Low < 19, Mod (19-27), High > 27): Readmission Risk Score: 6.3    Current PCP: PRINCESS Ramirez CNP  PCP verified by CM? (P) Yes    Chart Reviewed: Yes      History Provided by: (P) Patient  Patient Orientation: (P) Alert and Oriented    Patient Cognition: (P) Alert    Hospitalization in the last 30 days (Readmission):  Yes    If yes, Readmission Assessment in CM Navigator will be completed. Advance Directives:      Code Status: Full Code   Patient's Primary Decision Maker is: (P) Legal Next of Kin      Discharge Planning:    Patient lives with: (P) Spouse/Significant Other, Children Type of Home: (P) House  Primary Care Giver: (P) Self  Patient Support Systems include: (P) Spouse/Significant Other, Children   Current Financial resources: (P) Medicaid  Current community resources:    Current services prior to admission: (P) None            Current DME:              Type of Home Care services:  (P) None    ADLS  Prior functional level: (P) Independent in ADLs/IADLs  Current functional level: (P) Independent in ADLs/IADLs    PT AM-PAC:   /24  OT AM-PAC:   /24    Family can provide assistance at DC: (P) Yes  Would you like Case Management to discuss the discharge plan with any other family members/significant others, and if so, who?     Plans to Return to Present Housing: (P) Yes  Other Identified Issues/Barriers to RETURNING to current housing: none  Potential Assistance needed at discharge: (P) N/A            Potential DME:    Patient expects to

## 2023-08-10 VITALS
OXYGEN SATURATION: 96 % | SYSTOLIC BLOOD PRESSURE: 127 MMHG | WEIGHT: 204.37 LBS | RESPIRATION RATE: 12 BRPM | HEART RATE: 90 BPM | BODY MASS INDEX: 32.84 KG/M2 | HEIGHT: 66 IN | DIASTOLIC BLOOD PRESSURE: 86 MMHG | TEMPERATURE: 98.2 F

## 2023-08-10 LAB
ALBUMIN SERPL-MCNC: 3.1 G/DL (ref 3.5–5.2)
ALBUMIN/GLOB SERPL: 0.9 {RATIO} (ref 1–2.5)
ALP SERPL-CCNC: 102 U/L (ref 35–104)
ALT SERPL-CCNC: 47 U/L (ref 5–33)
ANION GAP SERPL CALCULATED.3IONS-SCNC: 15 MMOL/L (ref 9–17)
AST SERPL-CCNC: 45 U/L
BASOPHILS # BLD: 0.05 K/UL (ref 0–0.2)
BASOPHILS NFR BLD: 1 % (ref 0–2)
BILIRUB SERPL-MCNC: 0.2 MG/DL (ref 0.3–1.2)
BUN SERPL-MCNC: 9 MG/DL (ref 6–20)
CALCIUM SERPL-MCNC: 8.9 MG/DL (ref 8.6–10.4)
CHLORIDE SERPL-SCNC: 106 MMOL/L (ref 98–107)
CO2 SERPL-SCNC: 19 MMOL/L (ref 20–31)
CREAT SERPL-MCNC: 0.5 MG/DL (ref 0.5–0.9)
EOSINOPHIL # BLD: 0.07 K/UL (ref 0–0.44)
EOSINOPHILS RELATIVE PERCENT: 1 % (ref 1–4)
ERYTHROCYTE [DISTWIDTH] IN BLOOD BY AUTOMATED COUNT: 13.3 % (ref 11.8–14.4)
GFR SERPL CREATININE-BSD FRML MDRD: >60 ML/MIN/1.73M2
GLUCOSE SERPL-MCNC: 72 MG/DL (ref 70–99)
HCT VFR BLD AUTO: 33.4 % (ref 36.3–47.1)
HGB BLD-MCNC: 10.3 G/DL (ref 11.9–15.1)
IMM GRANULOCYTES # BLD AUTO: 0.14 K/UL (ref 0–0.3)
IMM GRANULOCYTES NFR BLD: 2 %
INR PPP: 1.1
LYMPHOCYTES NFR BLD: 1.96 K/UL (ref 1.1–3.7)
LYMPHOCYTES RELATIVE PERCENT: 23 % (ref 24–43)
MAGNESIUM SERPL-MCNC: 2.3 MG/DL (ref 1.6–2.6)
MCH RBC QN AUTO: 32 PG (ref 25.2–33.5)
MCHC RBC AUTO-ENTMCNC: 30.8 G/DL (ref 28.4–34.8)
MCV RBC AUTO: 103.7 FL (ref 82.6–102.9)
MONOCYTES NFR BLD: 0.72 K/UL (ref 0.1–1.2)
MONOCYTES NFR BLD: 8 % (ref 3–12)
NEUTROPHILS NFR BLD: 65 % (ref 36–65)
NEUTS SEG NFR BLD: 5.79 K/UL (ref 1.5–8.1)
NRBC BLD-RTO: 0 PER 100 WBC
PLATELET # BLD AUTO: 345 K/UL (ref 138–453)
PMV BLD AUTO: 9.6 FL (ref 8.1–13.5)
POTASSIUM SERPL-SCNC: 3.1 MMOL/L (ref 3.7–5.3)
PROT SERPL-MCNC: 6.6 G/DL (ref 6.4–8.3)
PROTHROMBIN TIME: 13.6 SEC (ref 11.7–14.9)
RBC # BLD AUTO: 3.22 M/UL (ref 3.95–5.11)
RBC # BLD: ABNORMAL 10*6/UL
SODIUM SERPL-SCNC: 140 MMOL/L (ref 135–144)
WBC OTHER # BLD: 8.7 K/UL (ref 3.5–11.3)

## 2023-08-10 PROCEDURE — 6360000002 HC RX W HCPCS: Performed by: NURSE PRACTITIONER

## 2023-08-10 PROCEDURE — 85025 COMPLETE CBC W/AUTO DIFF WBC: CPT

## 2023-08-10 PROCEDURE — 83735 ASSAY OF MAGNESIUM: CPT

## 2023-08-10 PROCEDURE — 6370000000 HC RX 637 (ALT 250 FOR IP): Performed by: STUDENT IN AN ORGANIZED HEALTH CARE EDUCATION/TRAINING PROGRAM

## 2023-08-10 PROCEDURE — 36415 COLL VENOUS BLD VENIPUNCTURE: CPT

## 2023-08-10 PROCEDURE — 6370000000 HC RX 637 (ALT 250 FOR IP): Performed by: NURSE PRACTITIONER

## 2023-08-10 PROCEDURE — 2580000003 HC RX 258: Performed by: NURSE PRACTITIONER

## 2023-08-10 PROCEDURE — 80053 COMPREHEN METABOLIC PANEL: CPT

## 2023-08-10 PROCEDURE — 85610 PROTHROMBIN TIME: CPT

## 2023-08-10 PROCEDURE — 99239 HOSP IP/OBS DSCHRG MGMT >30: CPT | Performed by: STUDENT IN AN ORGANIZED HEALTH CARE EDUCATION/TRAINING PROGRAM

## 2023-08-10 PROCEDURE — 6370000000 HC RX 637 (ALT 250 FOR IP): Performed by: INTERNAL MEDICINE

## 2023-08-10 PROCEDURE — 99232 SBSQ HOSP IP/OBS MODERATE 35: CPT | Performed by: INTERNAL MEDICINE

## 2023-08-10 RX ORDER — POTASSIUM CHLORIDE 20 MEQ/1
40 TABLET, EXTENDED RELEASE ORAL
Status: COMPLETED | OUTPATIENT
Start: 2023-08-10 | End: 2023-08-10

## 2023-08-10 RX ADMIN — ENOXAPARIN SODIUM 40 MG: 40 INJECTION SUBCUTANEOUS at 09:03

## 2023-08-10 RX ADMIN — Medication 2000 UNITS: at 09:01

## 2023-08-10 RX ADMIN — ACETAMINOPHEN 650 MG: 325 TABLET ORAL at 09:03

## 2023-08-10 RX ADMIN — LINEZOLID 600 MG: 600 TABLET, FILM COATED ORAL at 09:00

## 2023-08-10 RX ADMIN — FERROUS SULFATE TAB EC 325 MG (65 MG FE EQUIVALENT) 325 MG: 325 (65 FE) TABLET DELAYED RESPONSE at 09:00

## 2023-08-10 RX ADMIN — SODIUM CHLORIDE, PRESERVATIVE FREE 10 ML: 5 INJECTION INTRAVENOUS at 09:03

## 2023-08-10 RX ADMIN — POTASSIUM CHLORIDE 40 MEQ: 1500 TABLET, EXTENDED RELEASE ORAL at 12:16

## 2023-08-10 RX ADMIN — Medication 1 TABLET: at 09:02

## 2023-08-10 RX ADMIN — ACETAMINOPHEN 650 MG: 325 TABLET ORAL at 04:01

## 2023-08-10 RX ADMIN — POTASSIUM CHLORIDE 40 MEQ: 1500 TABLET, EXTENDED RELEASE ORAL at 09:02

## 2023-08-10 NOTE — DISCHARGE SUMMARY
[address]                 [facility], [address]                               DISCHARGE SUMMARY    PATIENT NAME: Jodi Tucker                  :        1990  MED REC NO:   [id number]                             ROOM:       0214  ACCOUNT NO:   [de-identified]                           ADMIT DATE: 2023  PROVIDER:     Gino Jasso. Daron Councilman, MD                  DISCHARGE DATE:  2023    ATTENDING PHYSICIAN OF HOSPITALIZATION:  Susannah Fulton MD    PERSONAL CARE PROVIDER:  Merced Gan, nurse practitioner. The patient was seen by Dr. Vimal Powell and Fide Higuera,  certified nurse practitioner midwife. DIAGNOSES:  1. Left lower extremity cellulitis. 2.  Infection, postpartum pregnancy. 3.  Fever. 4.  Tobacco abuse. 5.  Insufficient vascular access for treatment. Other medical problems set forth in the progress note or H and P of  2023, incorporated for reference herein. HISTORY OF PRESENT ILLNESS AND HOSPITAL COURSE:  The patient is a  38-year-old white female with recent pregnancy, who presented with fever  to 105.1. The patient also had complained of pain and tenderness in the  left lower extremity, which was hot, tender, and sore. Concern by  Obstetrics and Gynecology was that of postpartum infection, also with  evidence of the cellulitis. The patient was to be given ampicillin and  gentamicin due to the underlying gynecologic issues. unable to obtain IV access. for administration of  necessary IVs, arrangements made. The patient transferred to [place], [place], 2023. The patient's other medical problems included vitamin D deficiency,  hepatitis C antibody positivity by history, constipation, chronic  hepatitis C virus infection, abnormal Pap smears, depression.     LABORATORY DATA:  Around the time of discharge, included a white cell  count of 14.9, hemoglobin

## 2023-08-10 NOTE — CARE COORDINATION
Spoke to Yenny Tavera in outpatient pharmacy. PA for Zyvox has not been approved. Looked up Zyvox on [employer]. out of pocket cost is $25. Met with patient. She is able to pay cost. She would like Zyvox transferred to [facility] in [facility]. Notified outpatient pharmacy.  Coupon given to patient for Zyvox from [employer]. patient denies other needs for home and has transportation    Discharge [address] Case Management Department  Written by: Kannan Blas RN    Patient Name: David Sheets  Attending Provider: Dianna Andres MD  Admit Date: 2023 12:42 AM  MRN: [id number]  Account: [de-identified]                     : 1990  Discharge Date: 8/10/2023      Disposition: home    Kannan Blas RN

## 2023-08-10 NOTE — PROGRESS NOTES
Patient was given discharge instructions, which were reviewed with the patient, and all questions were answered. Patient was discharged with all of their belongings and their prescription was sent to their selected pharmacy. Patient declined a wheelchair and walked off of the unit upon discharge.

## 2023-08-10 NOTE — DISCHARGE SUMMARY
[facility]  Office: [id number]  1826, DO, Khushi Potter, DO, Bobby Mckeon, DO, Alex Medicine Blood, DO, Connie Shepard MD, Jose Hernandez MD, Subhash Mc MD, Lisa Dietz MD,  Rao Rasmussen MD, Bobbi Cunningham MD, Elo Layne, DO, Dimas Cloin MD,  Cara Hernandez, DO, Elizabeth Singh MD, Kathy Moanhan MD, Raya Lemus, DO, Nora Younger MD,  Amber Castillo, DO, Slime Fajardo MD, Rhonda Rodriguez MD, Rafael Batres MD, Noreen Rodgers MD,  Wallace Jackson MD, Duane Campanile, MD, Phil Alvares MD, Thomas Saunders, DO, Jazmín Dawson MD,  Janine Da Silva MD, Jj Krishnamurthy, CNP,  Daniel Graff, CNP, Brennen Goss, CNP, Santiago Corrales, CNP,  Della Bruner, [facility], Devora Rosen, CNP, Mir Awan, CNP, Geovanny Mendenhall, CNP, Kateryna aNrvaez, CNP, Tristian Silva, CNP, Carlton David PA-C, Aysha Torrez [facility], Vern Theodore, CNP, Abhijeet Moses, [facility]    Discharge Summary     Patient ID: Cade Stoddard  :  1990   MRN: [id number]     ACCOUNT:  [de-identified]   Patient's PCP: PRINCESS John CNP  Admit Date: 2023   Discharge Date: 8/10/2023     Length of Stay: 1  Code Status:  Full Code  Admitting Physician: Elizabeth Singh MD  Discharge Physician: Elizabeth Singh MD     Active Discharge Diagnoses:     Hospital Problem Lists:  Principal Problem:    Sepsis [facility])  Active Problems:    Tobacco use disorder, continuous    Chronic hepatitis C virus infection ([facility])    Cellulitis, streptococcal  Resolved Problems:    * No resolved hospital problems. *      Admission Condition:  poor     Discharged Condition: fair    Hospital Stay:     Hospital Course:  Cade Stoddard is a 26 y.o. female who was admitted for the management of   Sepsis ([address]) , presented to ER with LEG REDNESS. 53-year-old female presents as a transfer from [facility] with concern for left lower extremity cellulitis.   Patient is 6 MD  8/10/2023  3:25 PM      Thank you PRINCESS Barclay - ARTEMIO for the opportunity to be involved in this patient's care.

## 2023-08-10 NOTE — PLAN OF CARE
Problem: Discharge Planning  Goal: Discharge to home or other facility with appropriate resources  8/10/2023 0905 by Bob Menezes RN  Outcome: Completed  8/10/2023 0128 by Dillon Webber RN  Outcome: Progressing  8/9/2023 2017 by Bob Menezes RN  Outcome: Progressing     Problem: Pain  Goal: Verbalizes/displays adequate comfort level or baseline comfort level  8/10/2023 0905 by Bob Menezes RN  Outcome: Completed  8/10/2023 0128 by Dillon Webber RN  Outcome: Progressing

## 2023-08-10 NOTE — PROGRESS NOTES
[facility] - Initial Consult Note  Today's Date and Time: 8/10/2023, 9:04 AM    Impression :   Left Lower Extremity Cellulitis  Most likely Streptococcal in nature  S/p LTCS on 8/3/23  Fever  Leucocytosis  History of IV Drug abuse  Chronic Hep C Infection    Recommendations:   Linezolid 600 mg po BID x 7 days. Patient will not lactate until antibiotic treatment is completed  ID wise ok to continue treatment at home. Medical Decision Making/Summary/Discussion:8/10/2023       Infection Control Recommendations   [place] Precautions    Antimicrobial Stewardship Recommendations     Simplification of therapy  Targeted therapy  IV to oral conversion    Coordination of Outpatient Care:   Estimated Length of IV antimicrobials:TBD  Patient will need Midline Catheter Insertion: TBD  Patient will need PICC line Insertion:TBD  Patient will need: Home IV , [address] [place],  [facility],  LTAC: TBD  Patient will need outpatient wound care:No    Chief complaint/reason for consultation:   Left Lower Extremity Cellulitis    History of Present Illness:   Modesto Seay is a  26y.o.-year-old female who was initially admitted on 8/9/2023. Patient seen at the request of Dr. Maggy Valero. INITIAL HISTORY:    Patient gave birth via Caesarean Section on 8/3/23. Patient developed high grade fever (Tmax 105) and left lower leg redness,swelling and pain 8/7/23. She went to [address] [place] [address]. She reported having a cut on her left toe few months ago. Her WBC was raised. Duplex did not show any clot. CT urogram revealed mild hydroureteronephrosis and Left inguinal lymphadenopathy. Because of her previous drug history she had minimal peripheral access. They could not get a blood culture to start antibiotics. Therefore,transferred to [address] [place] [address]. At [facility],patient underwent central line placement. Blood culture was sent. She was started on Cefepime and Vancomycin.     ID was consulted for left

## 2023-08-10 NOTE — PLAN OF CARE
Problem: Discharge Planning  Goal: Discharge to home or other facility with appropriate resources  8/10/2023 0128 by Brooklynn Verduzco RN  Outcome: Progressing  8/9/2023 2017 by Ines Cordero RN  Outcome: Progressing     Problem: Pain  Goal: Verbalizes/displays adequate comfort level or baseline comfort level  Outcome: Progressing

## 2023-08-11 ENCOUNTER — TELEPHONE (OUTPATIENT)
Dept: FAMILY MEDICINE CLINIC | Age: 33
End: 2023-08-11

## 2023-08-13 LAB
MICROORGANISM SPEC CULT: NORMAL
SERVICE CMNT-IMP: NORMAL
SPECIMEN DESCRIPTION: NORMAL

## 2023-08-14 LAB
MICROORGANISM SPEC CULT: NORMAL
MICROORGANISM SPEC CULT: NORMAL
SERVICE CMNT-IMP: NORMAL
SERVICE CMNT-IMP: NORMAL
SPECIMEN DESCRIPTION: NORMAL
SPECIMEN DESCRIPTION: NORMAL

## 2023-08-14 NOTE — TELEPHONE ENCOUNTER
Care Transitions Initial Follow Up Call    Outreach made within 2 business days of discharge: Yes    Patient: David Sheets Patient : 1990   MRN: [id number]  Reason for Admission: Sepsis  Discharge Date: 8/10/23       Spoke with: Tessa Encarnacion    Discharge department/facility: [address] [place] [address] Interactive Patient Contact:  Was patient able to fill all prescriptions: Yes  Was patient instructed to bring all medications to the follow-up visit: Yes  Is patient taking all medications as directed in the discharge summary? Yes  Does patient understand their discharge instructions: Yes  Does patient have questions or concerns that need addressed prior to 7-14 day follow up office visit: no    Scheduled appointment with PCP within 7-14 days    Follow Up  Future Appointments   Date Time Provider [address] [place] [address]   2023 10:00 [facility]   Patient does not feel she needs an appointment at this time. Patient states her cellulitis is doing well. Patient states she is continuing the antibiotic at this time. Patient states she does not have any fevers at this time. Patient states that if she needs an appointment she will let this writer know.      Eleazar Mayo LPN

## 2023-08-25 ENCOUNTER — OFFICE VISIT (OUTPATIENT)
Dept: FAMILY MEDICINE CLINIC | Age: 33
End: 2023-08-25
Payer: MEDICAID

## 2023-08-25 VITALS
BODY MASS INDEX: 29.09 KG/M2 | HEART RATE: 100 BPM | OXYGEN SATURATION: 97 % | HEIGHT: 66 IN | WEIGHT: 181 LBS | TEMPERATURE: 98.6 F | SYSTOLIC BLOOD PRESSURE: 100 MMHG | DIASTOLIC BLOOD PRESSURE: 60 MMHG

## 2023-08-25 DIAGNOSIS — B35.4 TINEA CORPORIS: Primary | ICD-10-CM

## 2023-08-25 PROCEDURE — G8419 CALC BMI OUT NRM PARAM NOF/U: HCPCS | Performed by: NURSE PRACTITIONER

## 2023-08-25 PROCEDURE — 1111F DSCHRG MED/CURRENT MED MERGE: CPT | Performed by: NURSE PRACTITIONER

## 2023-08-25 PROCEDURE — 4004F PT TOBACCO SCREEN RCVD TLK: CPT | Performed by: NURSE PRACTITIONER

## 2023-08-25 PROCEDURE — 99214 OFFICE O/P EST MOD 30 MIN: CPT | Performed by: NURSE PRACTITIONER

## 2023-08-25 PROCEDURE — G8427 DOCREV CUR MEDS BY ELIG CLIN: HCPCS | Performed by: NURSE PRACTITIONER

## 2023-08-25 RX ORDER — TERBINAFINE HYDROCHLORIDE 250 MG/1
250 TABLET ORAL DAILY
Qty: 14 TABLET | Refills: 0 | Status: SHIPPED | OUTPATIENT
Start: 2023-08-25 | End: 2023-09-08

## 2023-08-25 ASSESSMENT — PATIENT HEALTH QUESTIONNAIRE - PHQ9
SUM OF ALL RESPONSES TO PHQ QUESTIONS 1-9: 0
3. TROUBLE FALLING OR STAYING ASLEEP: 0
10. IF YOU CHECKED OFF ANY PROBLEMS, HOW DIFFICULT HAVE THESE PROBLEMS MADE IT FOR YOU TO DO YOUR WORK, TAKE CARE OF THINGS AT HOME, OR GET ALONG WITH OTHER PEOPLE: 0
2. FEELING DOWN, DEPRESSED OR HOPELESS: 0
SUM OF ALL RESPONSES TO PHQ QUESTIONS 1-9: 0
6. FEELING BAD ABOUT YOURSELF - OR THAT YOU ARE A FAILURE OR HAVE LET YOURSELF OR YOUR FAMILY DOWN: 0
8. MOVING OR SPEAKING SO SLOWLY THAT OTHER PEOPLE COULD HAVE NOTICED. OR THE OPPOSITE, BEING SO FIGETY OR RESTLESS THAT YOU HAVE BEEN MOVING AROUND A LOT MORE THAN USUAL: 0
SUM OF ALL RESPONSES TO PHQ QUESTIONS 1-9: 0
1. LITTLE INTEREST OR PLEASURE IN DOING THINGS: 0
5. POOR APPETITE OR OVEREATING: 0
4. FEELING TIRED OR HAVING LITTLE ENERGY: 0
SUM OF ALL RESPONSES TO PHQ QUESTIONS 1-9: 0
7. TROUBLE CONCENTRATING ON THINGS, SUCH AS READING THE NEWSPAPER OR WATCHING TELEVISION: 0
9. THOUGHTS THAT YOU WOULD BE BETTER OFF DEAD, OR OF HURTING YOURSELF: 0
SUM OF ALL RESPONSES TO PHQ9 QUESTIONS 1 & 2: 0

## 2023-08-29 ENCOUNTER — TELEPHONE (OUTPATIENT)
Dept: FAMILY MEDICINE CLINIC | Age: 33
End: 2023-08-29

## 2023-08-29 DIAGNOSIS — L30.9 DERMATITIS: ICD-10-CM

## 2023-08-29 NOTE — TELEPHONE ENCOUNTER
Pt calling stating she was seen on Friday for a rash and given Lamisil, but pt states the rash is getting worse, it's all over her body, it's itching and burning, pt states she had been on Zyvox and pt is afraid that it was too strong, pt is now taking a probiotic but is unsure of what she should do, please advise.

## 2023-08-30 DIAGNOSIS — L30.9 DERMATITIS: Primary | ICD-10-CM

## 2023-08-30 RX ORDER — PREDNISONE 20 MG/1
20 TABLET ORAL 2 TIMES DAILY
Qty: 10 TABLET | Refills: 0 | Status: SHIPPED | OUTPATIENT
Start: 2023-08-30 | End: 2023-09-04

## 2023-08-30 RX ORDER — PREDNISONE 20 MG/1
20 TABLET ORAL 2 TIMES DAILY
Qty: 10 TABLET | Refills: 0 | Status: SHIPPED | OUTPATIENT
Start: 2023-08-30 | End: 2023-08-30 | Stop reason: SDUPTHER

## 2023-09-05 RX ORDER — FLUCONAZOLE 100 MG/1
100 TABLET ORAL DAILY
Qty: 10 TABLET | Refills: 0 | Status: SHIPPED | OUTPATIENT
Start: 2023-09-05 | End: 2023-09-15

## 2023-09-08 ENCOUNTER — TELEPHONE (OUTPATIENT)
Dept: FAMILY MEDICINE CLINIC | Age: 33
End: 2023-09-08

## 2023-09-08 DIAGNOSIS — L30.9 DERMATITIS: Primary | ICD-10-CM

## 2023-09-08 RX ORDER — PREDNISONE 20 MG/1
TABLET ORAL
Qty: 24 TABLET | Refills: 0 | Status: SHIPPED | OUTPATIENT
Start: 2023-09-08

## 2023-09-08 NOTE — TELEPHONE ENCOUNTER
Pt calling stating tomorrow is last day of her medication and she finished her steroids a few days ago and she feels like the rash is coming back, states while on the steroid it seemed to be going away and now after being off the steroid a few days it's itching and coming back, pt uses pended pharmacy.

## 2023-09-13 ENCOUNTER — OFFICE VISIT (OUTPATIENT)
Dept: PRIMARY CARE CLINIC | Age: 33
End: 2023-09-13
Payer: MEDICAID

## 2023-09-13 VITALS
OXYGEN SATURATION: 97 % | HEART RATE: 91 BPM | RESPIRATION RATE: 16 BRPM | BODY MASS INDEX: 29.31 KG/M2 | HEIGHT: 66 IN | WEIGHT: 182.38 LBS | DIASTOLIC BLOOD PRESSURE: 80 MMHG | TEMPERATURE: 98.6 F | SYSTOLIC BLOOD PRESSURE: 126 MMHG

## 2023-09-13 DIAGNOSIS — L03.116 LEFT LEG CELLULITIS: Primary | ICD-10-CM

## 2023-09-13 PROCEDURE — G8419 CALC BMI OUT NRM PARAM NOF/U: HCPCS | Performed by: FAMILY MEDICINE

## 2023-09-13 PROCEDURE — 99212 OFFICE O/P EST SF 10 MIN: CPT | Performed by: FAMILY MEDICINE

## 2023-09-13 PROCEDURE — 4004F PT TOBACCO SCREEN RCVD TLK: CPT | Performed by: FAMILY MEDICINE

## 2023-09-13 PROCEDURE — 99213 OFFICE O/P EST LOW 20 MIN: CPT | Performed by: FAMILY MEDICINE

## 2023-09-13 PROCEDURE — G8427 DOCREV CUR MEDS BY ELIG CLIN: HCPCS | Performed by: FAMILY MEDICINE

## 2023-09-13 RX ORDER — CEPHALEXIN 500 MG/1
500 CAPSULE ORAL 3 TIMES DAILY
Qty: 30 CAPSULE | Refills: 0 | Status: SHIPPED | OUTPATIENT
Start: 2023-09-13 | End: 2023-09-23

## 2023-09-13 RX ORDER — PNV,CALCIUM 72/IRON/FOLIC ACID 27 MG-1 MG
TABLET ORAL
COMMUNITY
Start: 2023-09-12

## 2023-09-13 NOTE — PROGRESS NOTES
2023     Gaudencio Jo (:  1990) is a 26 y.o. female, here for evaluation of the following medical concerns:    Skin Problem  This is a new problem. The current episode started today (patient has known chronic skin condition, but states that she had previously had severe infection develop and then due to the antibiotics had developed generalized fungal infection and needed treatment with systemic antifungals. ). The affected locations include the right lower leg. The rash is characterized by redness and dryness. She was exposed to nothing. Pertinent negatives include no fatigue, fever or shortness of breath. (Noticed some erythema to the right lower ankle today. Wanted to jump on this quickly due to previous history of severe infection with secondary fungal infection.  ) Past treatments include nothing. Did review patient's med list, allergies, social history,pmhx and pshx today as noted in the record. Review of Systems   Constitutional:  Negative for chills, fatigue and fever. HENT: Negative. Eyes: Negative. Respiratory:  Negative for shortness of breath. Cardiovascular: Negative. Gastrointestinal: Negative. Skin:  Positive for color change and wound. Prior to Visit Medications    Medication Sig Taking? Authorizing Provider   Prenatal Vit-Fe Fumarate-FA (WESTAB PLUS) 27-1 MG TABS  Yes Historical Provider, MD   Ascorbic Acid (VITAMIN C PO) Take by mouth Yes Historical Provider, MD   Probiotic Product (PROBIOTIC DAILY PO) Take by mouth Yes Historical Provider, MD   predniSONE (DELTASONE) 20 MG tablet 60mg daily x 4 days, 40mg daily x 4 days, 20mg daily x 4 days.  Yes PRINCESS Jenkins - CNP   fluconazole (DIFLUCAN) 100 MG tablet Take 1 tablet by mouth daily for 10 days Yes PRINCESS Jama - CNM        Social History     Tobacco Use    Smoking status: Every Day     Packs/day: 0.25     Years: 10.00     Additional pack years: 0.00     Total pack years:

## 2023-09-14 ENCOUNTER — OFFICE VISIT (OUTPATIENT)
Dept: FAMILY MEDICINE CLINIC | Age: 33
End: 2023-09-14
Payer: MEDICAID

## 2023-09-14 VITALS
HEIGHT: 66 IN | SYSTOLIC BLOOD PRESSURE: 118 MMHG | WEIGHT: 181.7 LBS | DIASTOLIC BLOOD PRESSURE: 70 MMHG | BODY MASS INDEX: 29.2 KG/M2 | TEMPERATURE: 98.1 F | HEART RATE: 86 BPM | OXYGEN SATURATION: 98 %

## 2023-09-14 DIAGNOSIS — L03.115 CELLULITIS OF RIGHT LOWER EXTREMITY: Primary | ICD-10-CM

## 2023-09-14 PROCEDURE — G8419 CALC BMI OUT NRM PARAM NOF/U: HCPCS | Performed by: FAMILY MEDICINE

## 2023-09-14 PROCEDURE — 4004F PT TOBACCO SCREEN RCVD TLK: CPT | Performed by: FAMILY MEDICINE

## 2023-09-14 PROCEDURE — G8427 DOCREV CUR MEDS BY ELIG CLIN: HCPCS | Performed by: FAMILY MEDICINE

## 2023-09-14 PROCEDURE — 99213 OFFICE O/P EST LOW 20 MIN: CPT | Performed by: FAMILY MEDICINE

## 2023-09-14 SDOH — ECONOMIC STABILITY: INCOME INSECURITY: HOW HARD IS IT FOR YOU TO PAY FOR THE VERY BASICS LIKE FOOD, HOUSING, MEDICAL CARE, AND HEATING?: NOT HARD AT ALL

## 2023-09-14 SDOH — ECONOMIC STABILITY: HOUSING INSECURITY
IN THE LAST 12 MONTHS, WAS THERE A TIME WHEN YOU DID NOT HAVE A STEADY PLACE TO SLEEP OR SLEPT IN A SHELTER (INCLUDING NOW)?: NO

## 2023-09-14 SDOH — ECONOMIC STABILITY: FOOD INSECURITY: WITHIN THE PAST 12 MONTHS, YOU WORRIED THAT YOUR FOOD WOULD RUN OUT BEFORE YOU GOT MONEY TO BUY MORE.: NEVER TRUE

## 2023-09-14 SDOH — ECONOMIC STABILITY: FOOD INSECURITY: WITHIN THE PAST 12 MONTHS, THE FOOD YOU BOUGHT JUST DIDN'T LAST AND YOU DIDN'T HAVE MONEY TO GET MORE.: NEVER TRUE

## 2023-09-14 ASSESSMENT — ENCOUNTER SYMPTOMS
COLOR CHANGE: 1
CHEST TIGHTNESS: 0
SHORTNESS OF BREATH: 0
COUGH: 0
WHEEZING: 0

## 2023-09-14 NOTE — PROGRESS NOTES
[address]  [address] [address]  Dept: [phone]  Dept Fax: [phone]  Loc: [phone]    Amelie Duane is a 26 y.o. female who presents today for her medical conditions/complaints as noted below. Amelie Duane is c/o of   Chief Complaint   Patient presents with    Skin Problem     Started a few days ago. Right ankle is red, swollen, and has no pain. Seen at urgent care given ATB        HPI:     HPI Here today for cellulitis follow up. She had some skin redness of her ankle for the past few days. She was in UC yesterday and was started on keflex. It is not sore anymore and it is much less red. She has not had any fevers. She had cellulitis a few weeks ago right after her baby was born and at that caused a 105 fever. She was then worried about this infection because she didn't want it to get worse. Past Medical History:   Diagnosis Date    Abnormal Pap smear of cervix     at 19years of age    Chronic hepatitis C virus infection ([address] [facility]) 2023    Depression           Social History     Tobacco Use    Smoking status: Every Day     Packs/day: 0.25     Years: 10.00     Additional pack years: 0.00     Total pack years: 2.50     Types: Cigarettes     Last attempt to quit: 3/14/2023     Years since quittin.5    Smokeless tobacco: Never   Substance Use Topics    Alcohol use: Not Currently     Current Outpatient Medications   Medication Sig Dispense Refill    Prenatal Vit-Fe Fumarate-FA (WESTAB PLUS) 27-1 MG TABS       Ascorbic Acid (VITAMIN C PO) Take by mouth      Probiotic Product (PROBIOTIC DAILY PO) Take by mouth      cephALEXin (KEFLEX) 500 MG capsule Take 1 capsule by mouth 3 times daily for 10 days 30 capsule 0    predniSONE (DELTASONE) 20 MG tablet 60mg daily x 4 days, 40mg daily x 4 days, 20mg daily x 4 days.  24 tablet 0     No current facility-administered medications for this

## 2023-09-18 ASSESSMENT — ENCOUNTER SYMPTOMS
SHORTNESS OF BREATH: 0
EYES NEGATIVE: 1
GASTROINTESTINAL NEGATIVE: 1
COLOR CHANGE: 1

## 2023-09-20 ENCOUNTER — POSTPARTUM VISIT (OUTPATIENT)
Dept: OBGYN | Age: 33
End: 2023-09-20
Payer: MEDICAID

## 2023-09-20 ENCOUNTER — TELEPHONE (OUTPATIENT)
Dept: OBGYN | Age: 33
End: 2023-09-20

## 2023-09-20 VITALS
WEIGHT: 182.8 LBS | SYSTOLIC BLOOD PRESSURE: 100 MMHG | DIASTOLIC BLOOD PRESSURE: 70 MMHG | BODY MASS INDEX: 29.5 KG/M2 | HEART RATE: 76 BPM

## 2023-09-20 PROCEDURE — 4004F PT TOBACCO SCREEN RCVD TLK: CPT | Performed by: ADVANCED PRACTICE MIDWIFE

## 2023-09-20 PROCEDURE — G8427 DOCREV CUR MEDS BY ELIG CLIN: HCPCS | Performed by: ADVANCED PRACTICE MIDWIFE

## 2023-09-20 PROCEDURE — G8419 CALC BMI OUT NRM PARAM NOF/U: HCPCS | Performed by: ADVANCED PRACTICE MIDWIFE

## 2023-09-20 RX ORDER — ASCORBIC ACID 500 MG
500 TABLET ORAL DAILY
Qty: 30 TABLET | Refills: 5 | Status: SHIPPED | OUTPATIENT
Start: 2023-09-20

## 2023-09-20 RX ORDER — ERGOCALCIFEROL 1.25 MG/1
50000 CAPSULE ORAL WEEKLY
Qty: 4 CAPSULE | Refills: 5 | Status: SHIPPED | OUTPATIENT
Start: 2023-09-20

## 2023-09-20 RX ORDER — ACETAMINOPHEN AND CODEINE PHOSPHATE 120; 12 MG/5ML; MG/5ML
1 SOLUTION ORAL DAILY
Qty: 28 TABLET | Refills: 5 | Status: SHIPPED | OUTPATIENT
Start: 2023-09-20

## 2023-09-20 ASSESSMENT — ENCOUNTER SYMPTOMS
GASTROINTESTINAL NEGATIVE: 1
EYES NEGATIVE: 1
RESPIRATORY NEGATIVE: 1

## 2023-09-20 NOTE — TELEPHONE ENCOUNTER
Patient is experiencing a rash after finishing a round of steroids. Patient states the rash has started between her breast, and now has as small spot on her hand and on her feet. Patient as not sure if she needed to follow up here or with her PCP.  Please advise